# Patient Record
Sex: MALE | Race: WHITE | NOT HISPANIC OR LATINO
[De-identification: names, ages, dates, MRNs, and addresses within clinical notes are randomized per-mention and may not be internally consistent; named-entity substitution may affect disease eponyms.]

---

## 2019-08-12 ENCOUNTER — FORM ENCOUNTER (OUTPATIENT)
Age: 79
End: 2019-08-12

## 2019-08-26 ENCOUNTER — FORM ENCOUNTER (OUTPATIENT)
Age: 79
End: 2019-08-26

## 2019-09-04 ENCOUNTER — FORM ENCOUNTER (OUTPATIENT)
Age: 79
End: 2019-09-04

## 2019-09-15 ENCOUNTER — FORM ENCOUNTER (OUTPATIENT)
Age: 79
End: 2019-09-15

## 2019-09-17 ENCOUNTER — FORM ENCOUNTER (OUTPATIENT)
Age: 79
End: 2019-09-17

## 2019-10-21 ENCOUNTER — FORM ENCOUNTER (OUTPATIENT)
Age: 79
End: 2019-10-21

## 2020-01-06 ENCOUNTER — FORM ENCOUNTER (OUTPATIENT)
Age: 80
End: 2020-01-06

## 2020-01-07 ENCOUNTER — EMERGENCY (EMERGENCY)
Facility: HOSPITAL | Age: 80
LOS: 0 days | Discharge: HOME | End: 2020-01-08
Attending: EMERGENCY MEDICINE | Admitting: EMERGENCY MEDICINE
Payer: MEDICARE

## 2020-01-07 VITALS
DIASTOLIC BLOOD PRESSURE: 63 MMHG | OXYGEN SATURATION: 95 % | HEART RATE: 81 BPM | TEMPERATURE: 98 F | SYSTOLIC BLOOD PRESSURE: 118 MMHG | RESPIRATION RATE: 18 BRPM

## 2020-01-07 DIAGNOSIS — J12.9 VIRAL PNEUMONIA, UNSPECIFIED: ICD-10-CM

## 2020-01-07 DIAGNOSIS — M79.10 MYALGIA, UNSPECIFIED SITE: ICD-10-CM

## 2020-01-07 DIAGNOSIS — Z87.891 PERSONAL HISTORY OF NICOTINE DEPENDENCE: ICD-10-CM

## 2020-01-07 DIAGNOSIS — R05 COUGH: ICD-10-CM

## 2020-01-07 DIAGNOSIS — R42 DIZZINESS AND GIDDINESS: ICD-10-CM

## 2020-01-07 LAB
ALBUMIN SERPL ELPH-MCNC: 4.5 G/DL — SIGNIFICANT CHANGE UP (ref 3.5–5.2)
ALP SERPL-CCNC: 141 U/L — HIGH (ref 30–115)
ALT FLD-CCNC: 11 U/L — SIGNIFICANT CHANGE UP (ref 0–41)
ANION GAP SERPL CALC-SCNC: 18 MMOL/L — HIGH (ref 7–14)
APPEARANCE UR: ABNORMAL
AST SERPL-CCNC: 29 U/L — SIGNIFICANT CHANGE UP (ref 0–41)
BASOPHILS # BLD AUTO: 0.03 K/UL — SIGNIFICANT CHANGE UP (ref 0–0.2)
BASOPHILS NFR BLD AUTO: 0.3 % — SIGNIFICANT CHANGE UP (ref 0–1)
BILIRUB SERPL-MCNC: 1.2 MG/DL — SIGNIFICANT CHANGE UP (ref 0.2–1.2)
BILIRUB UR-MCNC: ABNORMAL
BUN SERPL-MCNC: 25 MG/DL — HIGH (ref 10–20)
CALCIUM SERPL-MCNC: 8.7 MG/DL — SIGNIFICANT CHANGE UP (ref 8.5–10.1)
CHLORIDE SERPL-SCNC: 97 MMOL/L — LOW (ref 98–110)
CO2 SERPL-SCNC: 19 MMOL/L — SIGNIFICANT CHANGE UP (ref 17–32)
COLOR SPEC: YELLOW — SIGNIFICANT CHANGE UP
CREAT SERPL-MCNC: 1.7 MG/DL — HIGH (ref 0.7–1.5)
DIFF PNL FLD: NEGATIVE — SIGNIFICANT CHANGE UP
EOSINOPHIL # BLD AUTO: 0.04 K/UL — SIGNIFICANT CHANGE UP (ref 0–0.7)
EOSINOPHIL NFR BLD AUTO: 0.4 % — SIGNIFICANT CHANGE UP (ref 0–8)
GLUCOSE SERPL-MCNC: 95 MG/DL — SIGNIFICANT CHANGE UP (ref 70–99)
GLUCOSE UR QL: NEGATIVE — SIGNIFICANT CHANGE UP
HCT VFR BLD CALC: 37.4 % — LOW (ref 42–52)
HGB BLD-MCNC: 12.6 G/DL — LOW (ref 14–18)
IMM GRANULOCYTES NFR BLD AUTO: 0.4 % — HIGH (ref 0.1–0.3)
KETONES UR-MCNC: NEGATIVE — SIGNIFICANT CHANGE UP
LACTATE SERPL-SCNC: 1.3 MMOL/L — SIGNIFICANT CHANGE UP (ref 0.7–2)
LEUKOCYTE ESTERASE UR-ACNC: NEGATIVE — SIGNIFICANT CHANGE UP
LYMPHOCYTES # BLD AUTO: 2.16 K/UL — SIGNIFICANT CHANGE UP (ref 1.2–3.4)
LYMPHOCYTES # BLD AUTO: 20 % — LOW (ref 20.5–51.1)
MCHC RBC-ENTMCNC: 30.6 PG — SIGNIFICANT CHANGE UP (ref 27–31)
MCHC RBC-ENTMCNC: 33.7 G/DL — SIGNIFICANT CHANGE UP (ref 32–37)
MCV RBC AUTO: 90.8 FL — SIGNIFICANT CHANGE UP (ref 80–94)
MONOCYTES # BLD AUTO: 0.78 K/UL — HIGH (ref 0.1–0.6)
MONOCYTES NFR BLD AUTO: 7.2 % — SIGNIFICANT CHANGE UP (ref 1.7–9.3)
NEUTROPHILS # BLD AUTO: 7.74 K/UL — HIGH (ref 1.4–6.5)
NEUTROPHILS NFR BLD AUTO: 71.7 % — SIGNIFICANT CHANGE UP (ref 42.2–75.2)
NITRITE UR-MCNC: NEGATIVE — SIGNIFICANT CHANGE UP
NRBC # BLD: 0 /100 WBCS — SIGNIFICANT CHANGE UP (ref 0–0)
NT-PROBNP SERPL-SCNC: 496 PG/ML — HIGH (ref 0–300)
PH UR: 5.5 — SIGNIFICANT CHANGE UP (ref 5–8)
PLATELET # BLD AUTO: 169 K/UL — SIGNIFICANT CHANGE UP (ref 130–400)
POTASSIUM SERPL-MCNC: 4.8 MMOL/L — SIGNIFICANT CHANGE UP (ref 3.5–5)
POTASSIUM SERPL-SCNC: 4.8 MMOL/L — SIGNIFICANT CHANGE UP (ref 3.5–5)
PROT SERPL-MCNC: 7.5 G/DL — SIGNIFICANT CHANGE UP (ref 6–8)
PROT UR-MCNC: ABNORMAL
RBC # BLD: 4.12 M/UL — LOW (ref 4.7–6.1)
RBC # FLD: 12.9 % — SIGNIFICANT CHANGE UP (ref 11.5–14.5)
SODIUM SERPL-SCNC: 134 MMOL/L — LOW (ref 135–146)
SP GR SPEC: 1.02 — SIGNIFICANT CHANGE UP (ref 1.01–1.02)
TROPONIN T SERPL-MCNC: <0.01 NG/ML — SIGNIFICANT CHANGE UP
UROBILINOGEN FLD QL: ABNORMAL
WBC # BLD: 10.79 K/UL — SIGNIFICANT CHANGE UP (ref 4.8–10.8)
WBC # FLD AUTO: 10.79 K/UL — SIGNIFICANT CHANGE UP (ref 4.8–10.8)

## 2020-01-07 PROCEDURE — 99285 EMERGENCY DEPT VISIT HI MDM: CPT

## 2020-01-07 PROCEDURE — 93010 ELECTROCARDIOGRAM REPORT: CPT

## 2020-01-07 PROCEDURE — 71046 X-RAY EXAM CHEST 2 VIEWS: CPT | Mod: 26

## 2020-01-07 RX ORDER — SODIUM CHLORIDE 9 MG/ML
1000 INJECTION INTRAMUSCULAR; INTRAVENOUS; SUBCUTANEOUS ONCE
Refills: 0 | Status: COMPLETED | OUTPATIENT
Start: 2020-01-07 | End: 2020-01-07

## 2020-01-07 RX ORDER — SODIUM CHLORIDE 9 MG/ML
3 INJECTION INTRAMUSCULAR; INTRAVENOUS; SUBCUTANEOUS EVERY 8 HOURS
Refills: 0 | Status: DISCONTINUED | OUTPATIENT
Start: 2020-01-07 | End: 2020-01-08

## 2020-01-07 RX ADMIN — SODIUM CHLORIDE 3 MILLILITER(S): 9 INJECTION INTRAMUSCULAR; INTRAVENOUS; SUBCUTANEOUS at 22:00

## 2020-01-07 RX ADMIN — SODIUM CHLORIDE 1000 MILLILITER(S): 9 INJECTION INTRAMUSCULAR; INTRAVENOUS; SUBCUTANEOUS at 21:27

## 2020-01-07 NOTE — ED ADULT TRIAGE NOTE - CHIEF COMPLAINT QUOTE
BIBS from [facility] with low bp and dizziness/ pt. states has been having flu -like symptoms for 3 days, flu swab in office was negative

## 2020-01-07 NOTE — ED PROVIDER NOTE - PHYSICAL EXAMINATION
VITAL SIGNS: I have reviewed nursing notes and confirm.  CONSTITUTIONAL: Well-developed; well-nourished; in no acute distress.  SKIN: Skin exam is warm and dry, no acute rash. Mild venous stasis dermatitis  HEAD: Normocephalic; atraumatic.  EYES: PERRL, EOM intact; conjunctiva and sclera clear.  ENT: No nasal discharge; airway clear.  NECK: Supple; non tender.  CARD: S1, S2 normal; no murmurs, gallops, or rubs. Regular rate and rhythm.  RESP: No wheezes, rales or rhonchi.  ABD: Normal bowel sounds; soft; non-distended; non-tender; no hepatosplenomegaly.  BACK: mild discomfort to lower back, paraspinal area, over muscles, no flank tenderness  EXT: Normal ROM. No clubbing, cyanosis or edema.  LYMPH: No acute cervical adenopathy.  NEURO: Alert, oriented. Grossly unremarkable. No focal deficits.  PSYCH: Cooperative, appropriate.

## 2020-01-07 NOTE — ED PROVIDER NOTE - OBJECTIVE STATEMENT
80 yo male with pmh of cad with 2 stents, HTN, ptsd, bph presents to the ER for dizziness/flu like symptoms for 3 days. Pt states he has runny nose, sneezing, shivering, cold sweats, body aches. Pt drinking and eating very little for past 2 days. Went to see his cardio MD today (Dr Hernandez) who noticed low bp, gave him water and pretzels and told him to go to [facility] for flu test. Pt also had some diarrhea over past couple of days which is now improving. Pt denies N/V/dysuria. Is making urine but it is dark. States he went to [facility] and had SBP of 62. Denies HA/neck pain/rash/fever/belly pain/cp/leg pain or swelling.     PMH as above  Meds: isosorbide, metoprolol, plavix, lexapro, asa 81, flomax, proscar,   ALL: nkda  SH: +vapes, former smoker  PMD denies, Cardio Dr Hernandez

## 2020-01-07 NOTE — ED PROVIDER NOTE - CLINICAL SUMMARY MEDICAL DECISION MAKING FREE TEXT BOX
78 yo male with pmh of cad, htn, ptsd, bph, presents to ER for 3 days of flu-like sx. [place] [facility] given IVFs which helped him feel better, and vitals stable in our ER. Offered him admission which he refused, wants to go home. Given IVFs, checked labs, ekg, xray, given tamiflu and doxy and recommend follow up with PMD. Return to ER for any worsening of symptoms.

## 2020-01-07 NOTE — ED PROVIDER NOTE - PATIENT PORTAL LINK FT
You can access the FollowMyHealth Patient Portal offered by [facility] by registering at the following website: [email or web address]. By joining [employer]’s FollowMyHealth portal, you will also be able to view your health information using other applications (apps) compatible with our system.

## 2020-01-08 VITALS
RESPIRATION RATE: 18 BRPM | OXYGEN SATURATION: 99 % | DIASTOLIC BLOOD PRESSURE: 67 MMHG | HEART RATE: 66 BPM | TEMPERATURE: 97 F | SYSTOLIC BLOOD PRESSURE: 133 MMHG

## 2020-01-08 LAB
BACTERIA # UR AUTO: NEGATIVE — SIGNIFICANT CHANGE UP
EPI CELLS # UR: 4 /HPF — SIGNIFICANT CHANGE UP (ref 0–5)
HYALINE CASTS # UR AUTO: 48 /LPF — HIGH (ref 0–7)
RBC CASTS # UR COMP ASSIST: 13 /HPF — HIGH (ref 0–4)
WBC UR QL: 6 /HPF — HIGH (ref 0–5)

## 2020-01-08 NOTE — ED ADULT NURSE NOTE - NSIMPLEMENTINTERV_GEN_ALL_ED
Implemented All Universal Safety Interventions:  [place] to call system. Call bell, personal items and telephone within reach. Instruct patient to call for assistance. Room bathroom lighting operational. Non-slip footwear when patient is off stretcher. Physically safe environment: no spills, clutter or unnecessary equipment. Stretcher in lowest position, wheels locked, appropriate side rails in place.

## 2020-01-08 NOTE — ED ADULT NURSE NOTE - CHIEF COMPLAINT QUOTE
BIBS from [facility] with low bp and dizziness/ pt. states has been having flu -like symptoms for 3 days, flu swab in office was negative

## 2020-01-08 NOTE — ED ADULT NURSE NOTE - OBJECTIVE STATEMENT
pt with dizziness  went to [facility] for URI s/s, sent to ED from [facility] for low BP

## 2020-01-20 ENCOUNTER — FORM ENCOUNTER (OUTPATIENT)
Age: 80
End: 2020-01-20

## 2020-03-15 ENCOUNTER — FORM ENCOUNTER (OUTPATIENT)
Age: 80
End: 2020-03-15

## 2020-03-28 ENCOUNTER — INPATIENT (INPATIENT)
Facility: HOSPITAL | Age: 80
LOS: 3 days | Discharge: HOME | End: 2020-04-01
Attending: INTERNAL MEDICINE | Admitting: INTERNAL MEDICINE
Payer: MEDICARE

## 2020-03-28 VITALS
OXYGEN SATURATION: 97 % | RESPIRATION RATE: 18 BRPM | HEART RATE: 84 BPM | SYSTOLIC BLOOD PRESSURE: 98 MMHG | TEMPERATURE: 92 F | DIASTOLIC BLOOD PRESSURE: 64 MMHG

## 2020-03-28 DIAGNOSIS — U07.1 COVID-19: ICD-10-CM

## 2020-03-28 LAB
ALBUMIN SERPL ELPH-MCNC: 3.4 G/DL — LOW (ref 3.5–5.2)
ALP SERPL-CCNC: 119 U/L — HIGH (ref 30–115)
ALT FLD-CCNC: 13 U/L — SIGNIFICANT CHANGE UP (ref 0–41)
ANION GAP SERPL CALC-SCNC: 17 MMOL/L — HIGH (ref 7–14)
APPEARANCE UR: ABNORMAL
APTT BLD: 37.5 SEC — SIGNIFICANT CHANGE UP (ref 27–39.2)
AST SERPL-CCNC: 19 U/L — SIGNIFICANT CHANGE UP (ref 0–41)
BACTERIA # UR AUTO: NEGATIVE — SIGNIFICANT CHANGE UP
BASE EXCESS BLDV CALC-SCNC: -8.8 MMOL/L — LOW (ref -2–2)
BASOPHILS # BLD AUTO: 0.01 K/UL — SIGNIFICANT CHANGE UP (ref 0–0.2)
BASOPHILS NFR BLD AUTO: 0.1 % — SIGNIFICANT CHANGE UP (ref 0–1)
BILIRUB SERPL-MCNC: 0.5 MG/DL — SIGNIFICANT CHANGE UP (ref 0.2–1.2)
BILIRUB UR-MCNC: ABNORMAL
BLD GP AB SCN SERPL QL: SIGNIFICANT CHANGE UP
BUN SERPL-MCNC: 18 MG/DL — SIGNIFICANT CHANGE UP (ref 10–20)
CA-I SERPL-SCNC: 1.08 MMOL/L — LOW (ref 1.12–1.3)
CALCIUM SERPL-MCNC: 7.8 MG/DL — LOW (ref 8.5–10.1)
CHLORIDE SERPL-SCNC: 109 MMOL/L — SIGNIFICANT CHANGE UP (ref 98–110)
CO2 SERPL-SCNC: 17 MMOL/L — SIGNIFICANT CHANGE UP (ref 17–32)
COLOR SPEC: YELLOW — SIGNIFICANT CHANGE UP
CREAT SERPL-MCNC: 1.3 MG/DL — SIGNIFICANT CHANGE UP (ref 0.7–1.5)
DIFF PNL FLD: NEGATIVE — SIGNIFICANT CHANGE UP
EOSINOPHIL # BLD AUTO: 0.02 K/UL — SIGNIFICANT CHANGE UP (ref 0–0.7)
EOSINOPHIL NFR BLD AUTO: 0.3 % — SIGNIFICANT CHANGE UP (ref 0–8)
EPI CELLS # UR: 6 /HPF — HIGH (ref 0–5)
FLU A RESULT: NEGATIVE — SIGNIFICANT CHANGE UP
FLU A RESULT: NEGATIVE — SIGNIFICANT CHANGE UP
FLUAV AG NPH QL: NEGATIVE — SIGNIFICANT CHANGE UP
FLUBV AG NPH QL: NEGATIVE — SIGNIFICANT CHANGE UP
GAS PNL BLDV: 143 MMOL/L — SIGNIFICANT CHANGE UP (ref 136–145)
GAS PNL BLDV: SIGNIFICANT CHANGE UP
GLUCOSE SERPL-MCNC: 106 MG/DL — HIGH (ref 70–99)
GLUCOSE UR QL: NEGATIVE — SIGNIFICANT CHANGE UP
HCO3 BLDV-SCNC: 17 MMOL/L — LOW (ref 22–29)
HCT VFR BLD CALC: 35.6 % — LOW (ref 42–52)
HCT VFR BLDA CALC: 35.6 % — SIGNIFICANT CHANGE UP (ref 34–44)
HGB BLD CALC-MCNC: 11.6 G/DL — LOW (ref 14–18)
HGB BLD-MCNC: 12.2 G/DL — LOW (ref 14–18)
HYALINE CASTS # UR AUTO: 40 /LPF — HIGH (ref 0–7)
IMM GRANULOCYTES NFR BLD AUTO: 1.7 % — HIGH (ref 0.1–0.3)
INR BLD: 1.17 RATIO — SIGNIFICANT CHANGE UP (ref 0.65–1.3)
KETONES UR-MCNC: ABNORMAL
LACTATE BLDV-MCNC: 2 MMOL/L — HIGH (ref 0.5–1.6)
LEUKOCYTE ESTERASE UR-ACNC: NEGATIVE — SIGNIFICANT CHANGE UP
LYMPHOCYTES # BLD AUTO: 1.69 K/UL — SIGNIFICANT CHANGE UP (ref 1.2–3.4)
LYMPHOCYTES # BLD AUTO: 24.4 % — SIGNIFICANT CHANGE UP (ref 20.5–51.1)
MCHC RBC-ENTMCNC: 30.8 PG — SIGNIFICANT CHANGE UP (ref 27–31)
MCHC RBC-ENTMCNC: 34.3 G/DL — SIGNIFICANT CHANGE UP (ref 32–37)
MCV RBC AUTO: 89.9 FL — SIGNIFICANT CHANGE UP (ref 80–94)
MONOCYTES # BLD AUTO: 0.75 K/UL — HIGH (ref 0.1–0.6)
MONOCYTES NFR BLD AUTO: 10.8 % — HIGH (ref 1.7–9.3)
NEUTROPHILS # BLD AUTO: 4.34 K/UL — SIGNIFICANT CHANGE UP (ref 1.4–6.5)
NEUTROPHILS NFR BLD AUTO: 62.7 % — SIGNIFICANT CHANGE UP (ref 42.2–75.2)
NITRITE UR-MCNC: NEGATIVE — SIGNIFICANT CHANGE UP
NRBC # BLD: 0 /100 WBCS — SIGNIFICANT CHANGE UP (ref 0–0)
PCO2 BLDV: 36 MMHG — LOW (ref 41–51)
PH BLDV: 7.28 — SIGNIFICANT CHANGE UP (ref 7.26–7.43)
PH UR: 6.5 — SIGNIFICANT CHANGE UP (ref 5–8)
PLATELET # BLD AUTO: 432 K/UL — HIGH (ref 130–400)
PO2 BLDV: 26 MMHG — SIGNIFICANT CHANGE UP (ref 20–40)
POTASSIUM BLDV-SCNC: 3.5 MMOL/L — SIGNIFICANT CHANGE UP (ref 3.3–5.6)
POTASSIUM SERPL-MCNC: 3.9 MMOL/L — SIGNIFICANT CHANGE UP (ref 3.5–5)
POTASSIUM SERPL-SCNC: 3.9 MMOL/L — SIGNIFICANT CHANGE UP (ref 3.5–5)
PROT SERPL-MCNC: 6.5 G/DL — SIGNIFICANT CHANGE UP (ref 6–8)
PROT UR-MCNC: ABNORMAL
PROTHROM AB SERPL-ACNC: 13.5 SEC — HIGH (ref 9.95–12.87)
RBC # BLD: 3.96 M/UL — LOW (ref 4.7–6.1)
RBC # FLD: 12.3 % — SIGNIFICANT CHANGE UP (ref 11.5–14.5)
RBC CASTS # UR COMP ASSIST: 5 /HPF — HIGH (ref 0–4)
RSV RESULT: NEGATIVE — SIGNIFICANT CHANGE UP
RSV RNA RESP QL NAA+PROBE: NEGATIVE — SIGNIFICANT CHANGE UP
SAO2 % BLDV: 41 % — SIGNIFICANT CHANGE UP
SODIUM SERPL-SCNC: 143 MMOL/L — SIGNIFICANT CHANGE UP (ref 135–146)
SP GR SPEC: >1.05 (ref 1.01–1.02)
TROPONIN T SERPL-MCNC: <0.01 NG/ML — SIGNIFICANT CHANGE UP
UROBILINOGEN FLD QL: ABNORMAL
WBC # BLD: 6.93 K/UL — SIGNIFICANT CHANGE UP (ref 4.8–10.8)
WBC # FLD AUTO: 6.93 K/UL — SIGNIFICANT CHANGE UP (ref 4.8–10.8)
WBC UR QL: 7 /HPF — HIGH (ref 0–5)

## 2020-03-28 PROCEDURE — 71045 X-RAY EXAM CHEST 1 VIEW: CPT | Mod: 26

## 2020-03-28 PROCEDURE — 71260 CT THORAX DX C+: CPT | Mod: 26

## 2020-03-28 PROCEDURE — 72125 CT NECK SPINE W/O DYE: CPT | Mod: 26

## 2020-03-28 PROCEDURE — 99291 CRITICAL CARE FIRST HOUR: CPT | Mod: GC

## 2020-03-28 PROCEDURE — 74177 CT ABD & PELVIS W/CONTRAST: CPT | Mod: 26

## 2020-03-28 PROCEDURE — 70450 CT HEAD/BRAIN W/O DYE: CPT | Mod: 26

## 2020-03-28 PROCEDURE — 93010 ELECTROCARDIOGRAM REPORT: CPT

## 2020-03-28 RX ORDER — SODIUM CHLORIDE 9 MG/ML
1000 INJECTION, SOLUTION INTRAVENOUS ONCE
Refills: 0 | Status: COMPLETED | OUTPATIENT
Start: 2020-03-28 | End: 2020-03-28

## 2020-03-28 RX ADMIN — SODIUM CHLORIDE 1000 MILLILITER(S): 9 INJECTION, SOLUTION INTRAVENOUS at 21:57

## 2020-03-28 NOTE — ED PROVIDER NOTE - OBJECTIVE STATEMENT
80M h/o CAD s/p stenting, HTN, obesity p/w syncope, hypotension. Pt was feeling weak today, had 1 episode of nonbloody diarrhea. While walking to bathroom, he syncopised and hit left side, causing left sided pain. Pain is 6/10, constant, left sided, ache-like, started after falling today. Pt syncopised and fell again in his bedroom. Denies fever/chills, cough, SOB, abd pain, n/v/d, dysuria.

## 2020-03-28 NOTE — ED PROVIDER NOTE - CLINICAL SUMMARY MEDICAL DECISION MAKING FREE TEXT BOX
pt aware of all labs and imaging, subacute/chronic rib fractures, cxr concerning for COVID typical findings, COVID testing sent, pt on isolation, hypotension responsive to fluids, medical admitting team aware of pt and admission as pt also had syncope.

## 2020-03-28 NOTE — ED PROVIDER NOTE - INPATIENT RESIDENT/ACP NOTIFIED DATE
Cyclobenzaprine refill:  Last seen: 1/13/17  Follow up appointment: none  Last filled: 1/19/17 (#90 with 0 refills)    Okay to refill?   Please advise.     
Message from [employer]:  Original authorizing provider: MD DEVIN GrossDY SHAWN would like a refill of the following medications:  cyclobenzaprine (FLEXERIL) 10 MG tablet [Kaitlynn Velaqzuez MD]    Preferred pharmacy: [facility] [address] [place] [address] [id number] [facility]    Comment:    
refilled    
28-Mar-2020 23:53

## 2020-03-28 NOTE — ED PROVIDER NOTE - ATTENDING CONTRIBUTION TO CARE
81 y/o m w/ pmhx of cad s/p stent, htn, obesity, poor historian, BIBEMA for hypotension associated with multiple episodes of syncope. pt reports was weak today, had diarrhea, non-bloody, walked to bathroom, passed out, hit the L side of his head and body. found to be hypotensive by ems systolic in 80's, reporting pain to l side of body. denies fever, chills, n/v, cp, sob, pleuritic chest pain, palpitations, diaphoresis, cough, blurry vision/visual changes, neck pain/stiffness, back pain, abd pain,, hip pain,  numbness/tingling, HA/LH/dizziness, lacerations, abrasions, ecchymoses, or swelling. GCS15.  On Exam: Vital Signs: I have reviewed the initial vital signs.  Constitutional: Male pt sitting on stretcher in nad.  HEAD: No signs of basilar skull fracture.  Integumentary: No rash. No lacerations, abrasions, ecchymoses or swelling.  EYES: No periorbital swelling/ecchymoses. EOM intact. No nystagmus.  ENT: Dry MM. No rhinorrhea/otorrhea. No septal hematoma. No mastoid ecchymoses.  NECK: Supple, non-tender, no spinous tenderness to neck. No palpable shelves or step-offs.  BACK: No spinous tenderness. No palpable shelves or step-offs.  Cardiovascular: RRR, radial pulses 2/4 b/l. No pain to palpation to chest wall.  Respiratory: BS present b/l, poor air exchange, no accessory muscle use, no stridor. Pain to palpation to L sided lateral ribs. No crepitus.  Gastrointestinal: BS present throughout all 4 quadrants, soft, nd, nt no rebound tenderness or guarding, no cvat.  Musculoskeletal: FROM, no edema, no hip pain to palpation. No short leg. No internal or external rotation of LE.  Neurologic: GCS 15. Awake and alerting following commands, motor 5/5 and sensation intact throughout upper and lowe ext, CN II-XII intact, No facial droop or slurring of speech. No focal deficits.

## 2020-03-28 NOTE — ED PROVIDER NOTE - PHYSICAL EXAMINATION
Constitutional: Well developed, well nourished. NAD. Good general hygiene  Head: Atraumatic.  Eyes: PERRLA. EOMI without discomfort.   ENT: No nasal discharge. Mucous membranes moist.  Neck: Supple. Painless ROM.  Cardiovascular: Regular rhythm. Regular rate. Normal S1 and S2. No murmurs. 2+ pulses in all extremities.   Pulmonary: Normal respiratory rate and effort. Lungs clear to auscultation bilaterally. No wheezing, rales, or rhonchi. Bilateral, equal lung expansion.   Abdominal: Soft. Nondistended. Nontender. No rebound or guarding.   Extremities. Pelvis stable. No lower extremity edema. Symmetric calves.  Skin: No rashes. No ecchymosis.  Neuro: AAOx3. CN 2-12, 5/5 strength in all extremities, sensation equal and intact in all extremities, no dysmetria, no pronator drift. Ambulating in ED w/o difficulty.

## 2020-03-28 NOTE — ED PROVIDER NOTE - CARE PLAN
Assessment and plan of treatment:	Plan: Monitor, EKG, CXR, labs, urine, pan scan, reassess. Principal Discharge DX:	Syncope  Assessment and plan of treatment:	Plan: Monitor, EKG, CXR, labs, urine, pan scan, reassess.  Secondary Diagnosis:	Hypotension  Secondary Diagnosis:	Ground glass opacity present on imaging of lung

## 2020-03-28 NOTE — ED PROVIDER NOTE - PROGRESS NOTE DETAILS
AA: 80M h/o cardiac disease, HTN p/w syncope and fall. pt was hypotensive to 70s systolic en route, BP 90s systolic here. Otherwise not tachypneic, hypoxic. Primary and secondary survey negative. EFAST negative. Ground glass opacities noted on CT chest. BP improved to 100s systolic after 1L of LR. Will swab and admit. AA: Pending attending CT reads, urine. Will likely admit to floor. Normotensive currently

## 2020-03-28 NOTE — ED ADULT TRIAGE NOTE - CHIEF COMPLAINT QUOTE
pt had 2 syncopal episodes today. found to be 60/40 at home. c/o dizziness. had sycopal episode last week and metoprolol was d/c. denies any cough, fever. c/o dizziness

## 2020-03-28 NOTE — ED PROVIDER NOTE - NS ED ROS FT
General: Weakness. No fever/chills.  Eyes:  No visual changes, eye pain or discharge.  ENMT:  No hearing changes, pain, no sore throat or runny nose, no difficulty swallowing  Cardiac:  No chest pain, SOB or edema. No chest pain with exertion.  Respiratory:  No cough or respiratory distress. No hemoptysis. No history of asthma or RAD.  GI:  No nausea, vomiting, diarrhea or abdominal pain.  :  No dysuria, frequency or burning.  MS: Left sided pain.   Neuro: Syncope. No LOC. No unilateral numnbess/weakness.  Skin:  No skin rash.   Endocrine: No history of thyroid disease or diabetes.

## 2020-03-28 NOTE — ED ADULT NURSE NOTE - OBJECTIVE STATEMENT
pt aox4; comes from home. came with ems for falls; had 2 syncopal episodes today. states he fell on his left side, denies chest pain / sob / fevers / chills. pt hypotensive at home and on arrival to ed. iv in place, labs sent. ivf infusing. sent for ct scan with rn at bedside. pt remains on continuous monitoring. will continue to monitor / assess

## 2020-03-29 LAB
ALBUMIN SERPL ELPH-MCNC: 3.6 G/DL — SIGNIFICANT CHANGE UP (ref 3.5–5.2)
ALLERGY+IMMUNOLOGY DIAG STUDY NOTE: SIGNIFICANT CHANGE UP
ALP SERPL-CCNC: 122 U/L — HIGH (ref 30–115)
ALT FLD-CCNC: 13 U/L — SIGNIFICANT CHANGE UP (ref 0–41)
ANION GAP SERPL CALC-SCNC: 14 MMOL/L — SIGNIFICANT CHANGE UP (ref 7–14)
AST SERPL-CCNC: 18 U/L — SIGNIFICANT CHANGE UP (ref 0–41)
BASOPHILS # BLD AUTO: 0.01 K/UL — SIGNIFICANT CHANGE UP (ref 0–0.2)
BASOPHILS NFR BLD AUTO: 0.2 % — SIGNIFICANT CHANGE UP (ref 0–1)
BILIRUB SERPL-MCNC: 0.5 MG/DL — SIGNIFICANT CHANGE UP (ref 0.2–1.2)
BUN SERPL-MCNC: 21 MG/DL — HIGH (ref 10–20)
CALCIUM SERPL-MCNC: 8.7 MG/DL — SIGNIFICANT CHANGE UP (ref 8.5–10.1)
CHLORIDE SERPL-SCNC: 107 MMOL/L — SIGNIFICANT CHANGE UP (ref 98–110)
CO2 SERPL-SCNC: 21 MMOL/L — SIGNIFICANT CHANGE UP (ref 17–32)
CREAT SERPL-MCNC: 1.2 MG/DL — SIGNIFICANT CHANGE UP (ref 0.7–1.5)
DAT IGG-SP REAG RBC-IMP: SIGNIFICANT CHANGE UP
DIR ANTIGLOB POLYSPECIFIC INTERPRETATION: ABNORMAL
EOSINOPHIL # BLD AUTO: 0.01 K/UL — SIGNIFICANT CHANGE UP (ref 0–0.7)
EOSINOPHIL NFR BLD AUTO: 0.2 % — SIGNIFICANT CHANGE UP (ref 0–8)
GLUCOSE SERPL-MCNC: 114 MG/DL — HIGH (ref 70–99)
GRAM STN FLD: SIGNIFICANT CHANGE UP
HCT VFR BLD CALC: 32.4 % — LOW (ref 42–52)
HGB BLD-MCNC: 11.3 G/DL — LOW (ref 14–18)
IAT COMP-SP REAG SERPL QL: ABNORMAL
IMM GRANULOCYTES NFR BLD AUTO: 0.8 % — HIGH (ref 0.1–0.3)
LYMPHOCYTES # BLD AUTO: 1.55 K/UL — SIGNIFICANT CHANGE UP (ref 1.2–3.4)
LYMPHOCYTES # BLD AUTO: 31.6 % — SIGNIFICANT CHANGE UP (ref 20.5–51.1)
MCHC RBC-ENTMCNC: 31.5 PG — HIGH (ref 27–31)
MCHC RBC-ENTMCNC: 34.9 G/DL — SIGNIFICANT CHANGE UP (ref 32–37)
MCV RBC AUTO: 90.3 FL — SIGNIFICANT CHANGE UP (ref 80–94)
MONOCYTES # BLD AUTO: 0.58 K/UL — SIGNIFICANT CHANGE UP (ref 0.1–0.6)
MONOCYTES NFR BLD AUTO: 11.8 % — HIGH (ref 1.7–9.3)
NEUTROPHILS # BLD AUTO: 2.72 K/UL — SIGNIFICANT CHANGE UP (ref 1.4–6.5)
NEUTROPHILS NFR BLD AUTO: 55.4 % — SIGNIFICANT CHANGE UP (ref 42.2–75.2)
NRBC # BLD: 0 /100 WBCS — SIGNIFICANT CHANGE UP (ref 0–0)
PLATELET # BLD AUTO: 409 K/UL — HIGH (ref 130–400)
POTASSIUM SERPL-MCNC: 5.2 MMOL/L — HIGH (ref 3.5–5)
POTASSIUM SERPL-SCNC: 5.2 MMOL/L — HIGH (ref 3.5–5)
PROT SERPL-MCNC: 6.7 G/DL — SIGNIFICANT CHANGE UP (ref 6–8)
RBC # BLD: 3.59 M/UL — LOW (ref 4.7–6.1)
RBC # FLD: 12.5 % — SIGNIFICANT CHANGE UP (ref 11.5–14.5)
SODIUM SERPL-SCNC: 142 MMOL/L — SIGNIFICANT CHANGE UP (ref 135–146)
SPECIMEN SOURCE: SIGNIFICANT CHANGE UP
TROPONIN T SERPL-MCNC: <0.01 NG/ML — SIGNIFICANT CHANGE UP
WBC # BLD: 4.91 K/UL — SIGNIFICANT CHANGE UP (ref 4.8–10.8)
WBC # FLD AUTO: 4.91 K/UL — SIGNIFICANT CHANGE UP (ref 4.8–10.8)

## 2020-03-29 PROCEDURE — 72170 X-RAY EXAM OF PELVIS: CPT | Mod: 26

## 2020-03-29 PROCEDURE — 99223 1ST HOSP IP/OBS HIGH 75: CPT | Mod: AI

## 2020-03-29 RX ORDER — METOPROLOL TARTRATE 50 MG
100 TABLET ORAL DAILY
Refills: 0 | Status: DISCONTINUED | OUTPATIENT
Start: 2020-03-29 | End: 2020-03-31

## 2020-03-29 RX ORDER — SIMVASTATIN 20 MG/1
40 TABLET, FILM COATED ORAL AT BEDTIME
Refills: 0 | Status: DISCONTINUED | OUTPATIENT
Start: 2020-03-29 | End: 2020-04-01

## 2020-03-29 RX ORDER — SODIUM CHLORIDE 9 MG/ML
500 INJECTION INTRAMUSCULAR; INTRAVENOUS; SUBCUTANEOUS ONCE
Refills: 0 | Status: COMPLETED | OUTPATIENT
Start: 2020-03-29 | End: 2020-03-29

## 2020-03-29 RX ORDER — ACETAMINOPHEN 500 MG
650 TABLET ORAL EVERY 6 HOURS
Refills: 0 | Status: DISCONTINUED | OUTPATIENT
Start: 2020-03-29 | End: 2020-04-01

## 2020-03-29 RX ORDER — FINASTERIDE 5 MG/1
5 TABLET, FILM COATED ORAL DAILY
Refills: 0 | Status: DISCONTINUED | OUTPATIENT
Start: 2020-03-29 | End: 2020-04-01

## 2020-03-29 RX ORDER — CHLORHEXIDINE GLUCONATE 213 G/1000ML
1 SOLUTION TOPICAL
Refills: 0 | Status: DISCONTINUED | OUTPATIENT
Start: 2020-03-29 | End: 2020-04-01

## 2020-03-29 RX ORDER — OXYBUTYNIN CHLORIDE 5 MG
10 TABLET ORAL DAILY
Refills: 0 | Status: DISCONTINUED | OUTPATIENT
Start: 2020-03-29 | End: 2020-04-01

## 2020-03-29 RX ORDER — ISOSORBIDE MONONITRATE 60 MG/1
30 TABLET, EXTENDED RELEASE ORAL DAILY
Refills: 0 | Status: DISCONTINUED | OUTPATIENT
Start: 2020-03-29 | End: 2020-04-01

## 2020-03-29 RX ORDER — ESCITALOPRAM OXALATE 10 MG/1
20 TABLET, FILM COATED ORAL DAILY
Refills: 0 | Status: DISCONTINUED | OUTPATIENT
Start: 2020-03-29 | End: 2020-04-01

## 2020-03-29 RX ORDER — TAMSULOSIN HYDROCHLORIDE 0.4 MG/1
0.4 CAPSULE ORAL AT BEDTIME
Refills: 0 | Status: DISCONTINUED | OUTPATIENT
Start: 2020-03-29 | End: 2020-04-01

## 2020-03-29 RX ORDER — ENOXAPARIN SODIUM 100 MG/ML
40 INJECTION SUBCUTANEOUS
Refills: 0 | Status: DISCONTINUED | OUTPATIENT
Start: 2020-03-29 | End: 2020-04-01

## 2020-03-29 RX ORDER — CLOPIDOGREL BISULFATE 75 MG/1
75 TABLET, FILM COATED ORAL DAILY
Refills: 0 | Status: DISCONTINUED | OUTPATIENT
Start: 2020-03-29 | End: 2020-04-01

## 2020-03-29 RX ADMIN — TAMSULOSIN HYDROCHLORIDE 0.4 MILLIGRAM(S): 0.4 CAPSULE ORAL at 22:55

## 2020-03-29 RX ADMIN — SODIUM CHLORIDE 500 MILLILITER(S): 9 INJECTION INTRAMUSCULAR; INTRAVENOUS; SUBCUTANEOUS at 00:59

## 2020-03-29 RX ADMIN — ENOXAPARIN SODIUM 40 MILLIGRAM(S): 100 INJECTION SUBCUTANEOUS at 18:51

## 2020-03-29 RX ADMIN — SIMVASTATIN 40 MILLIGRAM(S): 20 TABLET, FILM COATED ORAL at 22:55

## 2020-03-29 NOTE — H&P ADULT - NSHPPHYSICALEXAM_GEN_ALL_CORE
T(C): 36.6 (03-28-20 @ 21:30), Max: 36.6 (03-28-20 @ 21:30)  HR: 80 (03-29-20 @ 03:00) (80 - 88)  BP: 121/66 (03-29-20 @ 03:00) (91/57 - 130/73)  RR: 18 (03-29-20 @ 03:00) (18 - 18)  SpO2: 100% (03-29-20 @ 03:00) (97% - 100%)    PHYSICAL EXAM:  GENERAL: NAD, well-developed  HEAD:  Atraumatic, Normocephalic  EYES: EOMI, PERRLA, conjunctiva and sclera clear  ENT:No nasal obstruction or discharge. No tonsillar exudate, swelling or erythema.  NECK: Supple, No JVD  CHEST/LUNG: Clear to auscultation bilaterally, decreased BS, no wheezing/rhonchi/rales  HEART: Regular rate and rhythm; No murmurs, rubs, or gallops  ABDOMEN: Soft, Nontender, Nondistended; Bowel sounds present  EXTREMITIES:  2+ Peripheral Pulses, No clubbing, cyanosis, or edema  PSYCH: AAOx3  NEUROLOGY: non-focal  SKIN: No rashes or lesions Vital Signs Last 24 Hrs  T(C): 36.6 (28 Mar 2020 21:30), Max: 36.6 (28 Mar 2020 21:30)  T(F): 97.8 (28 Mar 2020 21:30), Max: 97.8 (28 Mar 2020 21:30)  HR: 80 (29 Mar 2020 03:00) (80 - 88)  BP: 121/66 (29 Mar 2020 03:00) (91/57 - 130/73)  BP(mean): --  RR: 18 (29 Mar 2020 03:00) (18 - 18)  SpO2: 100% (29 Mar 2020 03:00) (97% - 100%)    PHYSICAL EXAM:  GENERAL: NAD, well-developed  HEAD:  Atraumatic, Normocephalic  EYES: EOMI, PERRLA, conjunctiva and sclera clear  ENT:No nasal obstruction or discharge. No tonsillar exudate, swelling or erythema.  NECK: Supple, No JVD  CHEST/LUNG: Clear to auscultation bilaterally, decreased BS, no wheezing/rhonchi/rales  HEART: Regular rate and rhythm; No murmurs, rubs, or gallops  ABDOMEN: Soft, Nontender, Nondistended; Bowel sounds present  EXTREMITIES:  2+ Peripheral Pulses, No clubbing, cyanosis, or edema  PSYCH: AAOx3  NEUROLOGY: non-focal  SKIN: No rashes or lesions

## 2020-03-29 NOTE — CHART NOTE - NSCHARTNOTEFT_GEN_A_CORE
80 year old M with PMH of CAD s/p stenting and HTN who was brought in by ambulance from home after two episodes of syncope earlier today.  no new events or complaints reported.     syncope unclear etiology/ rule out viral infection /covid vs cardiac etiology:   SPO2 stable on RA .   tele reviewed - no events noted.   fu ECHo  Orthostatic VS incomplete/ repeat orthostatic VS and if poor oral intake- c/w IVF hydration with recent diarrhea.  fu Covid-19 PCR.   restart home meds for CAD and HTN.   tylenol prn for fever.   fu CRP/ procalcitonin.   repeat CXR in am.     dvt ppx.

## 2020-03-29 NOTE — H&P ADULT - NSHPLABSRESULTS_GEN_ALL_CORE
12.2   6.93  )-----------( 432      ( 28 Mar 2020 21:50 )             35.6       03-28    143  |  109  |  18  ----------------------------<  106<H>  3.9   |  17  |  1.3    Ca    7.8<L>      28 Mar 2020 21:50    TPro  6.5  /  Alb  3.4<L>  /  TBili  0.5  /  DBili  x   /  AST  19  /  ALT  13  /  AlkPhos  119<H>  03-28              Urinalysis Basic - ( 28 Mar 2020 23:40 )    Color: Yellow / Appearance: Slightly Turbid / SG: >1.050 / pH: x  Gluc: x / Ketone: Small  / Bili: Small / Urobili: 3 mg/dL   Blood: x / Protein: 100 mg/dL / Nitrite: Negative   Leuk Esterase: Negative / RBC: 5 /HPF / WBC 7 /HPF   Sq Epi: x / Non Sq Epi: 6 /HPF / Bacteria: Negative        PT/INR - ( 28 Mar 2020 21:50 )   PT: 13.50 sec;   INR: 1.17 ratio         PTT - ( 28 Mar 2020 21:50 )  PTT:37.5 sec    Lactate Trend      CARDIAC MARKERS ( 28 Mar 2020 21:50 )  x     / <0.01 ng/mL / x     / x     / x            CAPILLARY BLOOD GLUCOSE      POCT Blood Glucose.: 88 mg/dL (28 Mar 2020 21:28)      < from: CT Chest w/ IV Cont (03.28.20 @ 22:18) >    IMPRESSION:    Numerous bilateral peripheral groundglass patchy opacities and nodules. This is suspicious for viral pneumonia    Spiculated solid nodule in the left lower lobe measuring up to 10 mm. Further evaluation with PET CT and soft tissue sampling may be of benefit when patient is clinically able.    No evidence of acute traumatic injury to the chest, abdomen or pelvis.    Subacute/chronic fractures of the left eighth and 12th ribs.    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      < end of copied text >

## 2020-03-29 NOTE — H&P ADULT - ASSESSMENT
80 year old M with PMH of CAD s/p stenting and HTN who was brought in by ambulance from home after two episodes of syncope earlier today. EMS found him to be hypotensive in the 80s.  In the ED, trauma workup negative.  BP 98/64 and vitals otherwise stable.  Patient resuscitated with 1.5 L fluid and responded well (BP improved).  B/l peripheral ground glass opacities noted on CT chest and COVID-19 was sent.    #Syncope, fall-- vasovagal vs orthostatic hypotension vs cardiac etiology  -trauma workup negative   -xray pelvis pending  -s/p 1.5 L fluids  -repeat EKG, follow up AM Trops  -echo ordered  -check orthostats  -tele monitoring  -pain control as needed    #Ground glass opacities on CT chest  -concerning for viral pneumonia/COVID  -COVID PCR pending  -labs WNL, afebrile, and currently asymptomatic (no cough, SOB, fevers)  -monitor AM labs, fever curve    #10 mm nodule in LLL seen on CT chest  -incidental finding, follow up with pulm outpatient    #HTN  -hold for now due to hypotension    #CAD s/p stenting  -restart home meds once verified with pharmacy (patient does not recall med list)  -follows with cardiologist, Dr. Pack    DASH Diet  DVT ppx  FULL CODE    ***Call pharmacy in the morning for medication list- patient does not recall medications and pharmacy was closed***

## 2020-03-29 NOTE — H&P ADULT - HISTORY OF PRESENT ILLNESS
80 year old M with PMH of CAD s/p stenting and HTN who was brought in by ambulance from home after two episodes of syncope earlier today.  Patient reports that he had been feeling weak earlier today and had one episode of nonbloody diarrhea.  Then, while walking to the bathroom, he became very dizzy and syncopized.  He came to, walked to his bedroom, and then passed out again.  He reports having fallen on his L side due to left sided pain when he came to.   He denies fever/chills, cough, SOB, abd pain, nausea/vomiting.  EMS found him to be hypotensive in the 80s.    In the ED, trauma workup negative.  BP 98/64 and vitals otherwise stable.  Patient resuscitated with 1.5 L fluid and responded well (BP improved).  B/l peripheral ground glass opacities noted on CT chest and COVID-19 was sent.

## 2020-03-29 NOTE — PATIENT PROFILE ADULT - NSPRESCRALCFREQ_GEN_A_NUR
[facility]  Section Operative Note    Patient Name: Courtney Alaniz  MRN:[id number]  : 1980  Date of Surgery: 10/04/19   Pre-operative diagnosis:  1. IUP at 40w0d  2. History of prior c/s, desiring repeat    Post-operative diagnosis:  Same   Procedure:  Repeat  section via pfannenstiel skin incision with double layer uterine closure, excision of right ovarian fibroid   Surgeon:  Dr. Tanika Cifuentes (OB/GYN Attending Staff)    Assistant(s):  Nilesh Maloney, PAC - His assistance was needed for retraction and fundal pressure to aide with delivery of the baby.    Anesthesia:  Spinal with duramorph and TAPs block    Quantitative blood loss:  521 mL    Total IV fluids:  900ml crystaloid    Blood transfusion:  No transfusion was given during surgery    Drains:  Machado catheter   Specimens:  Right ovarian fibroid    Findings:  No abdominal wall adhesions. No intraabdominal adhesions. Clear fluid with amniotomy. Liveborn, vigorous male infant born vertex. APGARS  7 and 9. Weight: pending due to fetal resusitation. Placenta appeared intact with a three vessel cord and no apparent gross pathology. Uterus appears normal and clear of any retained product. Normal bilateral fallopian tubes and ovaries, other than [id number] firm fibroid. Hemostatic surgical site at the end of the case.    Complications:  None apparent    Condition:  Stable, transferred to PACU    Comments:  See accompanying operative report for full details    Indication: Courtney Alaniz is a 39 year old, , who was admitted at 40w0d by LMP c/w 39w0d ultrasound for planned repeat  delivery. The risks, benefits, and alternatives of  delivery were explained and the patient agreed to proceed.    Procedure:  After obtaining informed consent  , the patient was taken to the operating room. She received 2 grams of ancef prior to the skin incision. She was placed in the dorsal supine position with a  leftward tilt and prepped and draped in the usual sterile fashion. Following test of adequate spinal anesthesia, the abdomen was entered through a transverse skin incision through her previous scar. The skin incision was made sharply and carried through the subcutaneous tissue to the fascia.  Fascia was incised in the midline and extended laterally with the Trinidad scissors. The superior margin of the fascial incision was grasped with Kocher clamps and dissected from the underlying muscle sharp and blunt dissecton, which was then repeated at the lower margin of the fascial incision.  The muscle was  in the midline. The peritoneum was entered bluntly and the opening extended by digital dissection with care to avoid the bladder. A bladder blade was placed. The lower segment of the uterus was opened sharply in a transverse fashion and extended with digital pressure. The infant's head was elevated to the level of the hysterotomy and was delivered atraumatically. The cord was doubly clamped and cut and the infant was handed off to the waiting SCN staff after 1 minute of delayed cord clamping. A segment of the cord was cut and set aside for cord gases if needed. The placenta was extracted via cord traction. The uterus was exteriorized from the abdomen and cleared of all clots and debris. Oxytocin was given through the running IV. With vigorous massage as well as administration of oxytocin, good uterine tone was achieved. The hysterotomy was repaired with 0-vicryl suture in a running locked fashion. A 2nd layer of 0-monocryl was  used to imbricate the incision and good hemostasis was achieved. The right ovarian fibroid was excised with cautery. The posterior cul-de-sac was suctioned and the uterus was returned to the abdomen. The bilateral pericolic gutters were irrigated. The hysterotomy was again inspected and found to have no active sites of bleeding. The abdominal wall was examined and found to have no active  sites of bleeding. The fascia was closed with a running suture of 0-vicryl. Subcutaneous tissue was irrigated. Areas that were oozing were controlled with cautery. The subcutaneous tissue was less than 3cm in depth and did not require reapproximation. The skin was closed with 4-0 monocryl and dermabond. The patient tolerated the procedure well and was taken to the recovery room in stable condition. All sponge, needle and instrument counts were correct x2.      Tanika Cifuentes MD   OB/GYN           Never

## 2020-03-29 NOTE — H&P ADULT - ATTENDING COMMENTS
81 YO M with a PMH of CAD s/p stenting and HTN who was BIBEMS after experiencing 2 syncopaql episodes earlier today in his home. + LOC, unclear if head trauma. Preceding symptoms of light-headedness. Associated symptoms of 1 episode of diarrhea (Non-black/bloody). In the ED, a trauma work-up was negative. CT-chest revealed B/L peripheral GGOs. Pt isolated and COVID19 swab sent.     Physical exam shows pt in NAD. VSS, afebrile, not hypoxic on 2L NC. A&Ox3. Non-focal neuro exam. Muscle strength/sensation intact. CTA B/L with no W/C/R. RRR, no M/G/R. ABD is soft and non-tender, normoactive BSs. LEs without swelling. No rashes. Labs and radiology as above. QTc 436    Syncopal episodes likely due to orthostatic hypotension/vasovagal from viral syndrome + acute hypoxic respiratory failure, needs COVID19 rule out. - Recent travel. - COVID19 contact. Admit to COVID19 isolation unit. COVID19 swab sent. Isolate pt. Send CRP and procal. Start on hydroxychloroquine (CXR changes + acute hypoxic respiratory failure requiring supplemental O2). IV ABxs (Azithro). Vitamin C/Thiamine/Zinc. IVFs (LR). APAP PRN. Anti-tussives PRN. Supplemental O2 PRN. C/w statins, if LFTs are not > 75. No NSAIDs. Serial EKGs. Echo. TSH. Check orthostatics. Serial cardiac enzymes. Telem admit. Fall precautions.     Hx of CAD s/p stenting and HTN. Restart home meds. GI and DVT PPX. Inform PCP of pt's admission to hospital. Fall precautions. Rest as per above note.

## 2020-03-30 LAB
-  COAGULASE NEGATIVE STAPHYLOCOCCUS: SIGNIFICANT CHANGE UP
ALBUMIN SERPL ELPH-MCNC: 3.6 G/DL — SIGNIFICANT CHANGE UP (ref 3.5–5.2)
ALP SERPL-CCNC: 123 U/L — HIGH (ref 30–115)
ALT FLD-CCNC: 15 U/L — SIGNIFICANT CHANGE UP (ref 0–41)
ANION GAP SERPL CALC-SCNC: 12 MMOL/L — SIGNIFICANT CHANGE UP (ref 7–14)
AST SERPL-CCNC: 24 U/L — SIGNIFICANT CHANGE UP (ref 0–41)
BASOPHILS # BLD AUTO: 0.02 K/UL — SIGNIFICANT CHANGE UP (ref 0–0.2)
BASOPHILS NFR BLD AUTO: 0.4 % — SIGNIFICANT CHANGE UP (ref 0–1)
BILIRUB SERPL-MCNC: 0.4 MG/DL — SIGNIFICANT CHANGE UP (ref 0.2–1.2)
BUN SERPL-MCNC: 17 MG/DL — SIGNIFICANT CHANGE UP (ref 10–20)
CALCIUM SERPL-MCNC: 8.8 MG/DL — SIGNIFICANT CHANGE UP (ref 8.5–10.1)
CHLORIDE SERPL-SCNC: 107 MMOL/L — SIGNIFICANT CHANGE UP (ref 98–110)
CO2 SERPL-SCNC: 22 MMOL/L — SIGNIFICANT CHANGE UP (ref 17–32)
CREAT SERPL-MCNC: 0.9 MG/DL — SIGNIFICANT CHANGE UP (ref 0.7–1.5)
CULTURE RESULTS: SIGNIFICANT CHANGE UP
CULTURE RESULTS: SIGNIFICANT CHANGE UP
EOSINOPHIL # BLD AUTO: 0.02 K/UL — SIGNIFICANT CHANGE UP (ref 0–0.7)
EOSINOPHIL NFR BLD AUTO: 0.4 % — SIGNIFICANT CHANGE UP (ref 0–8)
GLUCOSE SERPL-MCNC: 103 MG/DL — HIGH (ref 70–99)
HCT VFR BLD CALC: 32.8 % — LOW (ref 42–52)
HGB BLD-MCNC: 11 G/DL — LOW (ref 14–18)
IMM GRANULOCYTES NFR BLD AUTO: 0.4 % — HIGH (ref 0.1–0.3)
LYMPHOCYTES # BLD AUTO: 1.71 K/UL — SIGNIFICANT CHANGE UP (ref 1.2–3.4)
LYMPHOCYTES # BLD AUTO: 36.5 % — SIGNIFICANT CHANGE UP (ref 20.5–51.1)
MAGNESIUM SERPL-MCNC: 2 MG/DL — SIGNIFICANT CHANGE UP (ref 1.8–2.4)
MCHC RBC-ENTMCNC: 30.2 PG — SIGNIFICANT CHANGE UP (ref 27–31)
MCHC RBC-ENTMCNC: 33.5 G/DL — SIGNIFICANT CHANGE UP (ref 32–37)
MCV RBC AUTO: 90.1 FL — SIGNIFICANT CHANGE UP (ref 80–94)
METHOD TYPE: SIGNIFICANT CHANGE UP
MONOCYTES # BLD AUTO: 0.47 K/UL — SIGNIFICANT CHANGE UP (ref 0.1–0.6)
MONOCYTES NFR BLD AUTO: 10 % — HIGH (ref 1.7–9.3)
NEUTROPHILS # BLD AUTO: 2.44 K/UL — SIGNIFICANT CHANGE UP (ref 1.4–6.5)
NEUTROPHILS NFR BLD AUTO: 52.3 % — SIGNIFICANT CHANGE UP (ref 42.2–75.2)
NRBC # BLD: 0 /100 WBCS — SIGNIFICANT CHANGE UP (ref 0–0)
ORGANISM # SPEC MICROSCOPIC CNT: SIGNIFICANT CHANGE UP
ORGANISM # SPEC MICROSCOPIC CNT: SIGNIFICANT CHANGE UP
PLATELET # BLD AUTO: 410 K/UL — HIGH (ref 130–400)
POTASSIUM SERPL-MCNC: 5 MMOL/L — SIGNIFICANT CHANGE UP (ref 3.5–5)
POTASSIUM SERPL-SCNC: 5 MMOL/L — SIGNIFICANT CHANGE UP (ref 3.5–5)
PROT SERPL-MCNC: 6.8 G/DL — SIGNIFICANT CHANGE UP (ref 6–8)
RBC # BLD: 3.64 M/UL — LOW (ref 4.7–6.1)
RBC # FLD: 12.5 % — SIGNIFICANT CHANGE UP (ref 11.5–14.5)
SARS-COV-2 RNA SPEC QL NAA+PROBE: (no result)
SODIUM SERPL-SCNC: 141 MMOL/L — SIGNIFICANT CHANGE UP (ref 135–146)
SPECIMEN SOURCE: SIGNIFICANT CHANGE UP
SPECIMEN SOURCE: SIGNIFICANT CHANGE UP
WBC # BLD: 4.68 K/UL — LOW (ref 4.8–10.8)
WBC # FLD AUTO: 4.68 K/UL — LOW (ref 4.8–10.8)

## 2020-03-30 PROCEDURE — 86077 PHYS BLOOD BANK SERV XMATCH: CPT

## 2020-03-30 PROCEDURE — 99233 SBSQ HOSP IP/OBS HIGH 50: CPT

## 2020-03-30 PROCEDURE — 71045 X-RAY EXAM CHEST 1 VIEW: CPT | Mod: 26

## 2020-03-30 RX ADMIN — ISOSORBIDE MONONITRATE 30 MILLIGRAM(S): 60 TABLET, EXTENDED RELEASE ORAL at 11:29

## 2020-03-30 RX ADMIN — TAMSULOSIN HYDROCHLORIDE 0.4 MILLIGRAM(S): 0.4 CAPSULE ORAL at 22:06

## 2020-03-30 RX ADMIN — ENOXAPARIN SODIUM 40 MILLIGRAM(S): 100 INJECTION SUBCUTANEOUS at 06:13

## 2020-03-30 RX ADMIN — FINASTERIDE 5 MILLIGRAM(S): 5 TABLET, FILM COATED ORAL at 11:29

## 2020-03-30 RX ADMIN — Medication 10 MILLIGRAM(S): at 11:29

## 2020-03-30 RX ADMIN — SIMVASTATIN 40 MILLIGRAM(S): 20 TABLET, FILM COATED ORAL at 22:06

## 2020-03-30 RX ADMIN — Medication 100 MILLIGRAM(S): at 06:14

## 2020-03-30 RX ADMIN — ENOXAPARIN SODIUM 40 MILLIGRAM(S): 100 INJECTION SUBCUTANEOUS at 17:23

## 2020-03-30 RX ADMIN — ESCITALOPRAM OXALATE 20 MILLIGRAM(S): 10 TABLET, FILM COATED ORAL at 11:29

## 2020-03-30 RX ADMIN — CLOPIDOGREL BISULFATE 75 MILLIGRAM(S): 75 TABLET, FILM COATED ORAL at 11:28

## 2020-03-30 NOTE — PROGRESS NOTE ADULT - SUBJECTIVE AND OBJECTIVE BOX
patient feels well   no chest pain   no sob   no cough   diarrhea resolved     Vital Signs Last 24 Hrs  T(C): 35.8 (30 Mar 2020 06:00), Max: 36.3 (29 Mar 2020 12:59)  T(F): 96.4 (30 Mar 2020 06:00), Max: 97.3 (29 Mar 2020 12:59)  HR: 66 (30 Mar 2020 06:00) (62 - 68)  BP: 119/61 (30 Mar 2020 06:00) (117/71 - 148/70)  BP(mean): --  RR: 18 (30 Mar 2020 08:38) (18 - 18)  SpO2: 98% (30 Mar 2020 08:38) (96% - 99%)    PHYSICAL EXAM:  GENERAL: NAD,  Pulm: Clear to auscultation bilaterally; No wheeze  CV: Regular rate and rhythm; No murmurs, rubs, or gallops  GI: Soft, Nontender, Nondistended; Bowel sounds present  EXTREMITIES:  2+ Peripheral Pulses, No clubbing, cyanosis, or edema  PSYCH: AAOx3  NEUROLOGY: non-focal  SKIN: No rashes or lesions                          11.3   4.91  )-----------( 409      ( 29 Mar 2020 06:18 )             32.4     03-29    142  |  107  |  21<H>  ----------------------------<  114<H>  5.2<H>   |  21  |  1.2    Ca    8.7      29 Mar 2020 06:18    TPro  6.7  /  Alb  3.6  /  TBili  0.5  /  DBili  x   /  AST  18  /  ALT  13  /  AlkPhos  122<H>  03-29    LIVER FUNCTIONS - ( 29 Mar 2020 06:18 )  Alb: 3.6 g/dL / Pro: 6.7 g/dL / ALK PHOS: 122 U/L / ALT: 13 U/L / AST: 18 U/L / GGT: x           PT/INR - ( 28 Mar 2020 21:50 )   PT: 13.50 sec;   INR: 1.17 ratio         PTT - ( 28 Mar 2020 21:50 )  PTT:37.5 sec  CARDIAC MARKERS ( 29 Mar 2020 06:18 )  x     / <0.01 ng/mL / x     / x     / x      CARDIAC MARKERS ( 28 Mar 2020 21:50 )  x     / <0.01 ng/mL / x     / x     / x            Culture - Urine (collected 28 Mar 2020 23:40)  Source: .Urine Clean Catch (Midstream)  Final Report (30 Mar 2020 07:31):    <10,000 CFU/mL Normal Urogenital Sofia    Culture - Blood (collected 28 Mar 2020 21:55)  Source: .Blood Blood-Peripheral  Preliminary Report (30 Mar 2020 05:02):    No growth to date.    Culture - Blood (collected 28 Mar 2020 21:55)  Source: .Blood Blood-Peripheral  Gram Stain (29 Mar 2020 21:47):    Growth in aerobic bottle: Gram Positive Cocci in Clusters  Preliminary Report (29 Mar 2020 21:47):    Growth in aerobic bottle: Gram Positive Cocci in Clusters    ***Blood Panel PCR results on this specimen are available    approximately 3 hours after the Gram stain result.***    Gram stain, PCR, and/or culture results may not always    correspond due to difference in methodologies.    ************************************************************    This PCR assay was performed using [employer].    The following targets are tested for: Enterococcus,    vancomycin resistant enterococci, Listeria monocytogenes,    coagulase negative staphylococci, S. aureus,    methicillin resistant S. aureus, Streptococcus agalactiae    (Group B), S. pneumoniae, S. pyogenes (Group A),    Acinetobacter baumannii, Enterobacter cloacae, E. coli,    Klebsiella oxytoca, K. pneumoniae, Proteus sp.,    Serratia marcescens, Haemophilus influenzae,    Neisseria meningitidis, Pseudomonas aeruginosa, Candida    albicans, C. glabrata, C krusei, C parapsilosis,    C. tropicalis and the KPC resistance gene.  Organism: Blood Culture PCR (30 Mar 2020 00:42)  Organism: Blood Culture PCR (30 Mar 2020 00:42)        MEDICATIONS  (STANDING):  chlorhexidine 4% Liquid 1 Application(s) Topical <User Schedule>  clopidogrel Tablet 75 milliGRAM(s) Oral daily  enoxaparin Injectable 40 milliGRAM(s) SubCutaneous two times a day  escitalopram 20 milliGRAM(s) Oral daily  finasteride 5 milliGRAM(s) Oral daily  isosorbide   mononitrate ER Tablet (IMDUR) 30 milliGRAM(s) Oral daily  metoprolol succinate  milliGRAM(s) Oral daily  oxybutynin 10 milliGRAM(s) Oral daily  simvastatin 40 milliGRAM(s) Oral at bedtime  tamsulosin 0.4 milliGRAM(s) Oral at bedtime    MEDICATIONS  (PRN):  acetaminophen   Tablet .. 650 milliGRAM(s) Oral every 6 hours PRN Temp greater or equal to 38C (100.4F)

## 2020-03-30 NOTE — CONSULT NOTE ADULT - SUBJECTIVE AND OBJECTIVE BOX
T(C): 36.1 (03-30-20 @ 13:19), Max: 36.1 (03-30-20 @ 13:19)  HR: 67 (03-30-20 @ 13:19) (62 - 68)  BP: 101/59 (03-30-20 @ 13:19) (101/59 - 148/70)  RR: 18 (03-30-20 @ 13:19) (18 - 18)  SpO2: 98% (03-30-20 @ 08:38) (96% - 99%)    MOTOR EXAM      Physical Medicine And Rehabilitation Services are not indicated in this patient for the following Reason(s): Patient admitted 2 days ago with syncope and hypotension and found to be COVID pos. Medically stable and to be discharged tomorrow. Chart documents that he was indep with cane upon admission. PT to see to al for appropriate ambulatory device. D/C home in am.    [    ] Patient is medically unstable      [    ]  Patient does not have appropriate activity orders      [     ] Patient has no weight bearing status for:      [  x   ] Patient is independently ambulating      [     ]  Patient is from Skilled Nursing Facility and is appropriate to return      [     ] Patient was non-ambulatory prior to admission      [     ]  Other      W[facility]CEL PM&R / PT request

## 2020-03-30 NOTE — PROGRESS NOTE ADULT - SUBJECTIVE AND OBJECTIVE BOX
JUDI HANEY 80y Male  MRN#: [id number]   CODE STATUS: FULL      SUBJECTIVE  Patient is a 80y old Male who presents with a chief complaint of syncope (30 Mar 2020 13:57)    Currently admitted to medicine with the primary diagnosis of Syncope    Today is hospital day 2d, and this morning he is laying in bed comfortably and reports no overnight events.     OBJECTIVE  PAST MEDICAL & SURGICAL HISTORY    ALLERGIES:  No Known Allergies    MEDICATIONS:  STANDING MEDICATIONS  chlorhexidine 4% Liquid 1 Application(s) Topical <User Schedule>  clopidogrel Tablet 75 milliGRAM(s) Oral daily  enoxaparin Injectable 40 milliGRAM(s) SubCutaneous two times a day  escitalopram 20 milliGRAM(s) Oral daily  finasteride 5 milliGRAM(s) Oral daily  isosorbide   mononitrate ER Tablet (IMDUR) 30 milliGRAM(s) Oral daily  metoprolol succinate  milliGRAM(s) Oral daily  oxybutynin 10 milliGRAM(s) Oral daily  simvastatin 40 milliGRAM(s) Oral at bedtime  tamsulosin 0.4 milliGRAM(s) Oral at bedtime    PRN MEDICATIONS  acetaminophen   Tablet .. 650 milliGRAM(s) Oral every 6 hours PRN      VITAL SIGNS: Last 24 Hours  T(C): 36 (30 Mar 2020 21:26), Max: 36.1 (30 Mar 2020 13:19)  T(F): 96.8 (30 Mar 2020 21:26), Max: 96.9 (30 Mar 2020 13:19)  HR: 55 (30 Mar 2020 21:26) (55 - 68)  BP: 115/66 (30 Mar 2020 21:26) (101/59 - 119/61)  BP(mean): --  RR: 18 (30 Mar 2020 21:26) (18 - 18)  SpO2: 98% (30 Mar 2020 08:38) (98% - 98%)    LABS:                        11.0   4.68  )-----------( 410      ( 30 Mar 2020 12:23 )             32.8     03-30    141  |  107  |  17  ----------------------------<  103<H>  5.0   |  22  |  0.9    Ca    8.8      30 Mar 2020 12:23  Mg     2.0     03-30    TPro  6.8  /  Alb  3.6  /  TBili  0.4  /  DBili  x   /  AST  24  /  ALT  15  /  AlkPhos  123<H>  03-30    PT/INR - ( 28 Mar 2020 21:50 )   PT: 13.50 sec;   INR: 1.17 ratio         PTT - ( 28 Mar 2020 21:50 )  PTT:37.5 sec  Urinalysis Basic - ( 28 Mar 2020 23:40 )    Color: Yellow / Appearance: Slightly Turbid / SG: >1.050 / pH: x  Gluc: x / Ketone: Small  / Bili: Small / Urobili: 3 mg/dL   Blood: x / Protein: 100 mg/dL / Nitrite: Negative   Leuk Esterase: Negative / RBC: 5 /HPF / WBC 7 /HPF   Sq Epi: x / Non Sq Epi: 6 /HPF / Bacteria: Negative      Culture - Urine (collected 28 Mar 2020 23:40)  Source: .Urine Clean Catch (Midstream)  Final Report (30 Mar 2020 07:31):    <10,000 CFU/mL Normal Urogenital Sofia    Culture - Blood (collected 28 Mar 2020 21:55)  Source: .Blood Blood-Peripheral  Preliminary Report (30 Mar 2020 05:02):    No growth to date.    Culture - Blood (collected 28 Mar 2020 21:55)  Source: .Blood Blood-Peripheral  Gram Stain (29 Mar 2020 21:47):    Growth in aerobic bottle: Gram Positive Cocci in Clusters  Final Report (30 Mar 2020 17:57):    Growth in aerobic bottle: Staphylococcus hominis    Coag Negative Staphylococcus    Single set isolate, possible contaminant. Contact    Microbiology if susceptibility testing clinically    indicated.    ***Blood Panel PCR results on this specimen are available    approximately 3 hours after the Gram stain result.***    Gram stain, PCR, and/or culture results may not always    correspond due to difference in methodologies.    ************************************************************    This PCR assay was performed using [employer].    The following targets are tested for: Enterococcus,    vancomycin resistant enterococci, Listeria monocytogenes,    coagulase negative staphylococci, S. aureus,    methicillin resistant S. aureus, Streptococcus agalactiae    (Group B), S. pneumoniae, S. pyogenes (Group A),    Acinetobacter baumannii, Enterobacter cloacae, E. coli,    Klebsiella oxytoca, K. pneumoniae, Proteus sp.,    Serratia marcescens, Haemophilus influenzae,    Neisseria meningitidis, Pseudomonas aeruginosa, Candida    albicans, C. glabrata, C krusei, C parapsilosis,    C. tropicalis and the KPC resistance gene.  Organism: Blood Culture PCR (30 Mar 2020 17:57)  Organism: Blood Culture PCR (30 Mar 2020 17:57)      CARDIAC MARKERS ( 29 Mar 2020 06:18 )  x     / <0.01 ng/mL / x     / x     / x      CARDIAC MARKERS ( 28 Mar 2020 21:50 )  x     / <0.01 ng/mL / x     / x     / x          RADIOLOGY:    < from: Xray Chest 1 View- PORTABLE-Urgent (03.30.20 @ 11:04) >  Impression:      Peripheral lung opacities are stable.    < end of copied text >      < from: Xray Pelvis AP only (03.29.20 @ 09:02) >  Impression:    No evidence of acute fracture.    < end of copied text >      < from: Xray Chest 1 View-PORTABLE IMMEDIATE (03.28.20 @ 22:48) >  Impression:      New predominantly right-sided peripheral airspace opacities suspicious for a viral pneumonia in the appropriate clinical setting.    No evidence of acute traumatic pathology.    < end of copied text >      <(03.28.20 @ 22:18) >  EXAM:  CT ABDOMEN AND PELVIS IC        EXAM:  CT CHEST IC          IMPRESSION:    Numerous bilateral peripheral groundglass patchy opacities and nodules. This is suspicious for viral pneumonia    Spiculated solid nodule in the left lower lobe measuring up to 10 mm. Further evaluation with PET CT and soft tissue sampling may be of benefit when patient is clinically able.    No evidence of acute traumatic injury to the chest, abdomen or pelvis.    Subacute/chronic fractures of the left eighth and 12th ribs.    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    < end of copied text >      < from: CT Cervical Spine No Cont (03.28.20 @ 22:18) >  IMPRESSION:    No acute fracture or significant subluxation of the cervical spine.      < end of copied text >      < from: CT Head No Cont (03.28.20 @ 22:18) >  IMPRESSION:      No CT evidence for acute intracranial pathology.    < end of copied text >    PHYSICAL EXAM:    Completed by hospitalist.      ASSESSMENT & PLAN    80 year old M with PMH of CAD s/p stenting and HTN who was brought in by ambulance from home after two episodes of syncope earlier today. EMS found him to be hypotensive in the 80s.  In the ED, trauma workup negative.  BP 98/64 and vitals otherwise stable.  Patient resuscitated with 1.5 L fluid and responded well (BP improved).  B/l peripheral ground glass opacities noted on CT chest and COVID-19 was sent.    #Ground glass opacities on CT chest- COVID positive  - 3/30 CXR with stable bilateral peripheral opacities  -labs WNL and remains afebrile  -monitor AM labs, cxr  -blood cx with coag neg staph- likely contaminant; f/u repeat cx    #Syncope, fall- likely secondary to COVID infection  -trauma workup negative   -trops neg x2  -orthostatics neg    #10 mm nodule in LLL seen on CT chest  -incidental finding, follow up with pulm outpatient    #HTN  -continue home meds    #CAD s/p stenting  -continue home meds  -follows with cardiologist, Dr. Pack    DASH Diet  DVT ppx  FULL CODE

## 2020-03-30 NOTE — PROGRESS NOTE ADULT - ASSESSMENT
#diarrhea  secondary to COVID 19 causing hypovolemia causing syncope:   EKG unremarkable   no events on tele   diarrhea resolved   chest xray mild B/L opacities on room air sats 97%. not sob, no cough,   not on plaquinil given that patient is asymptomatic   check CRP and procalcitonin for baseline in case patient gets worse     Coag neg staph blood culture likely contaminant repeat blood culture     CAD: c/w home meds. stable from cardiac stand point     PT/Rehab eval and discharge planning in 24 hours     DVT PPX #diarrhea  secondary to COVID 19 causing hypovolemia causing syncope:   EKG unremarkable   no events on tele   diarrhea resolved   chest xray mild B/L opacities on room air sats 97%. not sob, no cough,   not on plaquinil given that patient is asymptomatic   check CRP and procalcitonin for baseline in case patient gets worse     Coag neg staph blood culture likely contaminant repeat blood culture     rib fractures : pain control     CAD: c/w home meds. stable from cardiac stand point     PT/Rehab eval and discharge planning in 24 hours     DVT PPX     discussed plan of care with wife Becky states thst she can not take care of patient   will need safe discharge plan   will have  call wife

## 2020-03-31 LAB
ALBUMIN SERPL ELPH-MCNC: 3.7 G/DL — SIGNIFICANT CHANGE UP (ref 3.5–5.2)
ALP SERPL-CCNC: 121 U/L — HIGH (ref 30–115)
ALT FLD-CCNC: 14 U/L — SIGNIFICANT CHANGE UP (ref 0–41)
ANION GAP SERPL CALC-SCNC: 12 MMOL/L — SIGNIFICANT CHANGE UP (ref 7–14)
AST SERPL-CCNC: 21 U/L — SIGNIFICANT CHANGE UP (ref 0–41)
BASOPHILS # BLD AUTO: 0.01 K/UL — SIGNIFICANT CHANGE UP (ref 0–0.2)
BASOPHILS NFR BLD AUTO: 0.2 % — SIGNIFICANT CHANGE UP (ref 0–1)
BILIRUB SERPL-MCNC: 0.5 MG/DL — SIGNIFICANT CHANGE UP (ref 0.2–1.2)
BUN SERPL-MCNC: 15 MG/DL — SIGNIFICANT CHANGE UP (ref 10–20)
CALCIUM SERPL-MCNC: 8.7 MG/DL — SIGNIFICANT CHANGE UP (ref 8.5–10.1)
CHLORIDE SERPL-SCNC: 109 MMOL/L — SIGNIFICANT CHANGE UP (ref 98–110)
CO2 SERPL-SCNC: 23 MMOL/L — SIGNIFICANT CHANGE UP (ref 17–32)
CREAT SERPL-MCNC: 0.9 MG/DL — SIGNIFICANT CHANGE UP (ref 0.7–1.5)
CRP SERPL-MCNC: 1.11 MG/DL — HIGH (ref 0–0.4)
EOSINOPHIL # BLD AUTO: 0.02 K/UL — SIGNIFICANT CHANGE UP (ref 0–0.7)
EOSINOPHIL NFR BLD AUTO: 0.4 % — SIGNIFICANT CHANGE UP (ref 0–8)
GLUCOSE SERPL-MCNC: 95 MG/DL — SIGNIFICANT CHANGE UP (ref 70–99)
HCT VFR BLD CALC: 30.7 % — LOW (ref 42–52)
HGB BLD-MCNC: 10.6 G/DL — LOW (ref 14–18)
IMM GRANULOCYTES NFR BLD AUTO: 0.4 % — HIGH (ref 0.1–0.3)
LYMPHOCYTES # BLD AUTO: 1.9 K/UL — SIGNIFICANT CHANGE UP (ref 1.2–3.4)
LYMPHOCYTES # BLD AUTO: 41.8 % — SIGNIFICANT CHANGE UP (ref 20.5–51.1)
MAGNESIUM SERPL-MCNC: 2.1 MG/DL — SIGNIFICANT CHANGE UP (ref 1.8–2.4)
MCHC RBC-ENTMCNC: 31.5 PG — HIGH (ref 27–31)
MCHC RBC-ENTMCNC: 34.5 G/DL — SIGNIFICANT CHANGE UP (ref 32–37)
MCV RBC AUTO: 91.4 FL — SIGNIFICANT CHANGE UP (ref 80–94)
MONOCYTES # BLD AUTO: 0.54 K/UL — SIGNIFICANT CHANGE UP (ref 0.1–0.6)
MONOCYTES NFR BLD AUTO: 11.9 % — HIGH (ref 1.7–9.3)
NEUTROPHILS # BLD AUTO: 2.06 K/UL — SIGNIFICANT CHANGE UP (ref 1.4–6.5)
NEUTROPHILS NFR BLD AUTO: 45.3 % — SIGNIFICANT CHANGE UP (ref 42.2–75.2)
NRBC # BLD: 0 /100 WBCS — SIGNIFICANT CHANGE UP (ref 0–0)
PLATELET # BLD AUTO: 364 K/UL — SIGNIFICANT CHANGE UP (ref 130–400)
POTASSIUM SERPL-MCNC: 4.8 MMOL/L — SIGNIFICANT CHANGE UP (ref 3.5–5)
POTASSIUM SERPL-SCNC: 4.8 MMOL/L — SIGNIFICANT CHANGE UP (ref 3.5–5)
PROCALCITONIN SERPL-MCNC: 0.06 NG/ML — SIGNIFICANT CHANGE UP (ref 0.02–0.1)
PROT SERPL-MCNC: 6.6 G/DL — SIGNIFICANT CHANGE UP (ref 6–8)
RBC # BLD: 3.36 M/UL — LOW (ref 4.7–6.1)
RBC # FLD: 12.5 % — SIGNIFICANT CHANGE UP (ref 11.5–14.5)
SODIUM SERPL-SCNC: 144 MMOL/L — SIGNIFICANT CHANGE UP (ref 135–146)
WBC # BLD: 4.55 K/UL — LOW (ref 4.8–10.8)
WBC # FLD AUTO: 4.55 K/UL — LOW (ref 4.8–10.8)

## 2020-03-31 PROCEDURE — 71045 X-RAY EXAM CHEST 1 VIEW: CPT | Mod: 26

## 2020-03-31 PROCEDURE — 99232 SBSQ HOSP IP/OBS MODERATE 35: CPT

## 2020-03-31 RX ORDER — METOPROLOL TARTRATE 50 MG
50 TABLET ORAL DAILY
Refills: 0 | Status: DISCONTINUED | OUTPATIENT
Start: 2020-04-01 | End: 2020-04-01

## 2020-03-31 RX ADMIN — ESCITALOPRAM OXALATE 20 MILLIGRAM(S): 10 TABLET, FILM COATED ORAL at 12:21

## 2020-03-31 RX ADMIN — Medication 100 MILLIGRAM(S): at 05:39

## 2020-03-31 RX ADMIN — Medication 10 MILLIGRAM(S): at 12:21

## 2020-03-31 RX ADMIN — ENOXAPARIN SODIUM 40 MILLIGRAM(S): 100 INJECTION SUBCUTANEOUS at 17:13

## 2020-03-31 RX ADMIN — FINASTERIDE 5 MILLIGRAM(S): 5 TABLET, FILM COATED ORAL at 12:21

## 2020-03-31 RX ADMIN — CLOPIDOGREL BISULFATE 75 MILLIGRAM(S): 75 TABLET, FILM COATED ORAL at 12:21

## 2020-03-31 RX ADMIN — ENOXAPARIN SODIUM 40 MILLIGRAM(S): 100 INJECTION SUBCUTANEOUS at 05:39

## 2020-03-31 RX ADMIN — ISOSORBIDE MONONITRATE 30 MILLIGRAM(S): 60 TABLET, EXTENDED RELEASE ORAL at 12:21

## 2020-03-31 RX ADMIN — SIMVASTATIN 40 MILLIGRAM(S): 20 TABLET, FILM COATED ORAL at 21:29

## 2020-03-31 RX ADMIN — TAMSULOSIN HYDROCHLORIDE 0.4 MILLIGRAM(S): 0.4 CAPSULE ORAL at 21:29

## 2020-03-31 NOTE — PROGRESS NOTE ADULT - SUBJECTIVE AND OBJECTIVE BOX
For clarification, I did not cancel the PT consult and they are going to see the patient today to evaluate for ambulatory safety for discharge home. I apologize for any miscommunication.

## 2020-03-31 NOTE — PROGRESS NOTE ADULT - ASSESSMENT
#diarrhea  secondary to COVID 19 causing hypovolemia causing syncope:   EKG unremarkable   no events on tele   diarrhea resolved   chest xray mild B/L opacities on room air sats 98%. not sob, no cough,   not on plaquinil given that patient is not hypoxic   d/c tele       Coag neg staph blood culture likely contaminant repeat blood culture pending     rib fractures : pain control     CAD: c/w home meds. stable from cardiac stand point     needs PT eval patient states that he feels weak and unsteady on his feet will reorder PT eval     DVT PPX     discussed plan of care with wife Bceky and patient they are both interested in SNF if needed   pending PT/Rehab and repeat Blood culture

## 2020-03-31 NOTE — PROGRESS NOTE ADULT - SUBJECTIVE AND OBJECTIVE BOX
no chest pain   no sob   no cough   on room air   feels weak and tired     Vital Signs Last 24 Hrs  T(C): 35.8 (31 Mar 2020 06:30), Max: 36.1 (30 Mar 2020 13:19)  T(F): 96.5 (31 Mar 2020 06:30), Max: 96.9 (30 Mar 2020 13:19)  HR: 58 (31 Mar 2020 06:30) (55 - 67)  BP: 116/65 (31 Mar 2020 06:30) (101/59 - 116/65)  BP(mean): --  RR: 18 (31 Mar 2020 08:08) (18 - 18)  SpO2: 95% (31 Mar 2020 08:08) (95% - 97%)    PHYSICAL EXAM:  GENERAL: NAD,   Pulm: B/L air entry   HEART: Regular rate and rhythm;   GI: Soft, Nontender, Nondistended; Bowel sounds present  EXTREMITIES:  no  edema  PSYCH: AAOx3  NEUROLOGY: moving all ext   SKIN: No rashes or lesions                          10.6   4.55  )-----------( 364      ( 31 Mar 2020 06:14 )             30.7     03-31    144  |  109  |  15  ----------------------------<  95  4.8   |  23  |  0.9    Ca    8.7      31 Mar 2020 06:14  Mg     2.1     03-31    TPro  6.6  /  Alb  3.7  /  TBili  0.5  /  DBili  x   /  AST  21  /  ALT  14  /  AlkPhos  121<H>  03-31    LIVER FUNCTIONS - ( 31 Mar 2020 06:14 )  Alb: 3.7 g/dL / Pro: 6.6 g/dL / ALK PHOS: 121 U/L / ALT: 14 U/L / AST: 21 U/L / GGT: x                   Culture - Urine (collected 28 Mar 2020 23:40)  Source: .Urine Clean Catch (Midstream)  Final Report (30 Mar 2020 07:31):    <10,000 CFU/mL Normal Urogenital Sofia    Culture - Blood (collected 28 Mar 2020 21:55)  Source: .Blood Blood-Peripheral  Preliminary Report (30 Mar 2020 05:02):    No growth to date.    Culture - Blood (collected 28 Mar 2020 21:55)  Source: .Blood Blood-Peripheral  Gram Stain (29 Mar 2020 21:47):    Growth in aerobic bottle: Gram Positive Cocci in Clusters  Final Report (30 Mar 2020 17:57):    Growth in aerobic bottle: Staphylococcus hominis    Coag Negative Staphylococcus    Single set isolate, possible contaminant. Contact    Microbiology if susceptibility testing clinically    indicated.    ***Blood Panel PCR results on this specimen are available    approximately 3 hours after the Gram stain result.***    Gram stain, PCR, and/or culture results may not always    correspond due to difference in methodologies.    ************************************************************    This PCR assay was performed using [employer].    The following targets are tested for: Enterococcus,    vancomycin resistant enterococci, Listeria monocytogenes,    coagulase negative staphylococci, S. aureus,    methicillin resistant S. aureus, Streptococcus agalactiae    (Group B), S. pneumoniae, S. pyogenes (Group A),    Acinetobacter baumannii, Enterobacter cloacae, E. coli,    Klebsiella oxytoca, K. pneumoniae, Proteus sp.,    Serratia marcescens, Haemophilus influenzae,    Neisseria meningitidis, Pseudomonas aeruginosa, Candida    albicans, C. glabrata, C krusei, C parapsilosis,    C. tropicalis and the KPC resistance gene.  Organism: Blood Culture PCR (30 Mar 2020 17:57)  Organism: Blood Culture PCR (30 Mar 2020 17:57)

## 2020-03-31 NOTE — PROGRESS NOTE ADULT - SUBJECTIVE AND OBJECTIVE BOX
JUDI HANEY 80y Male  MRN#: [id number]   CODE STATUS: FULL      SUBJECTIVE  Patient is a 80y old Male who presents with a chief complaint of syncope (31 Mar 2020 11:59)    Currently admitted to medicine with the primary diagnosis of Syncope     Today is hospital day 3d, and this morning he is laying in bed comfortably and reports no overnight events.     OBJECTIVE  PAST MEDICAL & SURGICAL HISTORY    ALLERGIES:  No Known Allergies    MEDICATIONS:  STANDING MEDICATIONS  chlorhexidine 4% Liquid 1 Application(s) Topical <User Schedule>  clopidogrel Tablet 75 milliGRAM(s) Oral daily  enoxaparin Injectable 40 milliGRAM(s) SubCutaneous two times a day  escitalopram 20 milliGRAM(s) Oral daily  finasteride 5 milliGRAM(s) Oral daily  isosorbide   mononitrate ER Tablet (IMDUR) 30 milliGRAM(s) Oral daily  oxybutynin 10 milliGRAM(s) Oral daily  simvastatin 40 milliGRAM(s) Oral at bedtime  tamsulosin 0.4 milliGRAM(s) Oral at bedtime    PRN MEDICATIONS  acetaminophen   Tablet .. 650 milliGRAM(s) Oral every 6 hours PRN      VITAL SIGNS: Last 24 Hours  T(C): 35.8 (31 Mar 2020 06:30), Max: 36.1 (30 Mar 2020 13:19)  T(F): 96.5 (31 Mar 2020 06:30), Max: 96.9 (30 Mar 2020 13:19)  HR: 58 (31 Mar 2020 06:30) (55 - 67)  BP: 116/65 (31 Mar 2020 06:30) (101/59 - 116/65)  BP(mean): --  RR: 18 (31 Mar 2020 08:08) (18 - 18)  SpO2: 95% (31 Mar 2020 08:08) (95% - 97%)    LABS:                        10.6   4.55  )-----------( 364      ( 31 Mar 2020 06:14 )             30.7     03-31    144  |  109  |  15  ----------------------------<  95  4.8   |  23  |  0.9    Ca    8.7      31 Mar 2020 06:14  Mg     2.1     03-31    TPro  6.6  /  Alb  3.7  /  TBili  0.5  /  DBili  x   /  AST  21  /  ALT  14  /  AlkPhos  121<H>  03-31      Culture - Urine (collected 28 Mar 2020 23:40)  Source: .Urine Clean Catch (Midstream)  Final Report (30 Mar 2020 07:31):    <10,000 CFU/mL Normal Urogenital Sofia    Culture - Blood (collected 28 Mar 2020 21:55)  Source: .Blood Blood-Peripheral  Preliminary Report (30 Mar 2020 05:02):    No growth to date.    Culture - Blood (collected 28 Mar 2020 21:55)  Source: .Blood Blood-Peripheral  Gram Stain (29 Mar 2020 21:47):    Growth in aerobic bottle: Gram Positive Cocci in Clusters  Final Report (30 Mar 2020 17:57):    Growth in aerobic bottle: Staphylococcus hominis    Coag Negative Staphylococcus    Single set isolate, possible contaminant. Contact    Microbiology if susceptibility testing clinically    indicated.    ***Blood Panel PCR results on this specimen are available    approximately 3 hours after the Gram stain result.***    Gram stain, PCR, and/or culture results may not always    correspond due to difference in methodologies.    ************************************************************    This PCR assay was performed using [employer].    The following targets are tested for: Enterococcus,    vancomycin resistant enterococci, Listeria monocytogenes,    coagulase negative staphylococci, S. aureus,    methicillin resistant S. aureus, Streptococcus agalactiae    (Group B), S. pneumoniae, S. pyogenes (Group A),    Acinetobacter baumannii, Enterobacter cloacae, E. coli,    Klebsiella oxytoca, K. pneumoniae, Proteus sp.,    Serratia marcescens, Haemophilus influenzae,    Neisseria meningitidis, Pseudomonas aeruginosa, Candida    albicans, C. glabrata, C krusei, C parapsilosis,    C. tropicalis and the KPC resistance gene.  Organism: Blood Culture PCR (30 Mar 2020 17:57)  Organism: Blood Culture PCR (30 Mar 2020 17:57)      RADIOLOGY:    < from: Xray Chest 1 View- PORTABLE-Urgent (03.30.20 @ 11:04) >  Impression:      Peripheral lung opacities are stable.    < end of copied text >      < from: Xray Pelvis AP only (03.29.20 @ 09:02) >  Impression:    No evidence of acute fracture.    < end of copied text >      < from: Xray Chest 1 View-PORTABLE IMMEDIATE (03.28.20 @ 22:48) >  Impression:      New predominantly right-sided peripheral airspace opacities suspicious for a viral pneumonia in the appropriate clinical setting.    No evidence of acute traumatic pathology.    < end of copied text >      <(03.28.20 @ 22:18) >  EXAM:  CT ABDOMEN AND PELVIS IC        EXAM:  CT CHEST IC          IMPRESSION:    Numerous bilateral peripheral groundglass patchy opacities and nodules. This is suspicious for viral pneumonia    Spiculated solid nodule in the left lower lobe measuring up to 10 mm. Further evaluation with PET CT and soft tissue sampling may be of benefit when patient is clinically able.    No evidence of acute traumatic injury to the chest, abdomen or pelvis.    Subacute/chronic fractures of the left eighth and 12th ribs.    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    < end of copied text >      < from: CT Cervical Spine No Cont (03.28.20 @ 22:18) >  IMPRESSION:    No acute fracture or significant subluxation of the cervical spine.      < end of copied text >      < from: CT Head No Cont (03.28.20 @ 22:18) >  IMPRESSION:      No CT evidence for acute intracranial pathology.    < end of copied text >    PHYSICAL EXAM:    Completed by hospitalist.      ASSESSMENT & PLAN    80 year old M with PMH of CAD s/p stenting and HTN who was brought in by ambulance from home after two episodes of syncope earlier today. EMS found him to be hypotensive in the 80s.  In the ED, trauma workup negative.  BP 98/64 and vitals otherwise stable.  Patient resuscitated with 1.5 L fluid and responded well (BP improved).  B/l peripheral ground glass opacities noted on CT chest and COVID-19 was sent.    #Ground glass opacities on CT chest- COVID positive  - 3/30 CXR with stable bilateral peripheral opacities. 3/31 appears stable from prior- f/u official read  - satting 95% on room air today  -remains afebrile, Hb slowly trending down?, labs otherwise WNL  -monitor AM labs  -blood cx with coag neg staph- likely contaminant; repeat cx pending    #Syncope, fall- likely secondary to COVID infection  -trauma workup negative   -trops neg x2  -orthostatics neg    #10 mm nodule in LLL seen on CT chest  -incidental finding, follow up with pulm outpatient    #HTN  -continue home meds    #CAD s/p stenting  -continue home meds  -follows with cardiologist, Dr. Pack    DASH Diet  DVT ppx  FULL CODE  Dispo: pt would like to go to rehab, awaiting PT eval

## 2020-03-31 NOTE — PHYSICAL THERAPY INITIAL EVALUATION ADULT - GAIT DISTANCE, PT EVAL
standing only for 20 seconds, pt c/o of feeling lightheaded and woozy. BP seated 95/58, HR 74 bpm, SPO2 on room air 98%.  Pt reported feeling better, tolerated standing for 5 seconds with dizziness again. BP 87/59. Tomasa BLANCAS made aware. standing only for 20 seconds, pt c/o of feeling lightheaded and woozy. BP seated 95/58, HR 74 bpm, SPO2 on room air 98%.  Pt reported feeling better, tolerated standing for 5 seconds with dizziness again. BP 87/59. Tomasa BLANCAS made aware. Pt returned to bed 113/62, reported feeling better

## 2020-04-01 ENCOUNTER — TRANSCRIPTION ENCOUNTER (OUTPATIENT)
Age: 80
End: 2020-04-01

## 2020-04-01 VITALS
RESPIRATION RATE: 18 BRPM | DIASTOLIC BLOOD PRESSURE: 65 MMHG | SYSTOLIC BLOOD PRESSURE: 138 MMHG | HEART RATE: 70 BPM | TEMPERATURE: 99 F

## 2020-04-01 LAB
ALBUMIN SERPL ELPH-MCNC: 3.8 G/DL — SIGNIFICANT CHANGE UP (ref 3.5–5.2)
ALP SERPL-CCNC: 124 U/L — HIGH (ref 30–115)
ALT FLD-CCNC: 15 U/L — SIGNIFICANT CHANGE UP (ref 0–41)
ANION GAP SERPL CALC-SCNC: 11 MMOL/L — SIGNIFICANT CHANGE UP (ref 7–14)
AST SERPL-CCNC: 21 U/L — SIGNIFICANT CHANGE UP (ref 0–41)
BASOPHILS # BLD AUTO: 0.01 K/UL — SIGNIFICANT CHANGE UP (ref 0–0.2)
BASOPHILS NFR BLD AUTO: 0.2 % — SIGNIFICANT CHANGE UP (ref 0–1)
BILIRUB SERPL-MCNC: 0.5 MG/DL — SIGNIFICANT CHANGE UP (ref 0.2–1.2)
BUN SERPL-MCNC: 15 MG/DL — SIGNIFICANT CHANGE UP (ref 10–20)
CALCIUM SERPL-MCNC: 9 MG/DL — SIGNIFICANT CHANGE UP (ref 8.5–10.1)
CHLORIDE SERPL-SCNC: 107 MMOL/L — SIGNIFICANT CHANGE UP (ref 98–110)
CO2 SERPL-SCNC: 24 MMOL/L — SIGNIFICANT CHANGE UP (ref 17–32)
CREAT SERPL-MCNC: 0.8 MG/DL — SIGNIFICANT CHANGE UP (ref 0.7–1.5)
EOSINOPHIL # BLD AUTO: 0.03 K/UL — SIGNIFICANT CHANGE UP (ref 0–0.7)
EOSINOPHIL NFR BLD AUTO: 0.6 % — SIGNIFICANT CHANGE UP (ref 0–8)
GLUCOSE SERPL-MCNC: 126 MG/DL — HIGH (ref 70–99)
HCT VFR BLD CALC: 32.3 % — LOW (ref 42–52)
HGB BLD-MCNC: 10.8 G/DL — LOW (ref 14–18)
IMM GRANULOCYTES NFR BLD AUTO: 0.4 % — HIGH (ref 0.1–0.3)
LYMPHOCYTES # BLD AUTO: 1.8 K/UL — SIGNIFICANT CHANGE UP (ref 1.2–3.4)
LYMPHOCYTES # BLD AUTO: 35.5 % — SIGNIFICANT CHANGE UP (ref 20.5–51.1)
MAGNESIUM SERPL-MCNC: 2 MG/DL — SIGNIFICANT CHANGE UP (ref 1.8–2.4)
MCHC RBC-ENTMCNC: 30.3 PG — SIGNIFICANT CHANGE UP (ref 27–31)
MCHC RBC-ENTMCNC: 33.4 G/DL — SIGNIFICANT CHANGE UP (ref 32–37)
MCV RBC AUTO: 90.7 FL — SIGNIFICANT CHANGE UP (ref 80–94)
MONOCYTES # BLD AUTO: 0.47 K/UL — SIGNIFICANT CHANGE UP (ref 0.1–0.6)
MONOCYTES NFR BLD AUTO: 9.3 % — SIGNIFICANT CHANGE UP (ref 1.7–9.3)
NEUTROPHILS # BLD AUTO: 2.74 K/UL — SIGNIFICANT CHANGE UP (ref 1.4–6.5)
NEUTROPHILS NFR BLD AUTO: 54 % — SIGNIFICANT CHANGE UP (ref 42.2–75.2)
NRBC # BLD: 0 /100 WBCS — SIGNIFICANT CHANGE UP (ref 0–0)
PLATELET # BLD AUTO: 357 K/UL — SIGNIFICANT CHANGE UP (ref 130–400)
POTASSIUM SERPL-MCNC: 4.5 MMOL/L — SIGNIFICANT CHANGE UP (ref 3.5–5)
POTASSIUM SERPL-SCNC: 4.5 MMOL/L — SIGNIFICANT CHANGE UP (ref 3.5–5)
PROT SERPL-MCNC: 6.7 G/DL — SIGNIFICANT CHANGE UP (ref 6–8)
RBC # BLD: 3.56 M/UL — LOW (ref 4.7–6.1)
RBC # FLD: 12.5 % — SIGNIFICANT CHANGE UP (ref 11.5–14.5)
SODIUM SERPL-SCNC: 142 MMOL/L — SIGNIFICANT CHANGE UP (ref 135–146)
WBC # BLD: 5.07 K/UL — SIGNIFICANT CHANGE UP (ref 4.8–10.8)
WBC # FLD AUTO: 5.07 K/UL — SIGNIFICANT CHANGE UP (ref 4.8–10.8)

## 2020-04-01 PROCEDURE — 99239 HOSP IP/OBS DSCHRG MGMT >30: CPT

## 2020-04-01 RX ORDER — METOPROLOL TARTRATE 50 MG
1 TABLET ORAL
Qty: 60 | Refills: 0
Start: 2020-04-01 | End: 2020-05-30

## 2020-04-01 RX ORDER — METOPROLOL TARTRATE 50 MG
1 TABLET ORAL
Qty: 0 | Refills: 0 | DISCHARGE
Start: 2020-04-01

## 2020-04-01 RX ORDER — METOPROLOL TARTRATE 50 MG
1 TABLET ORAL
Qty: 0 | Refills: 0 | DISCHARGE

## 2020-04-01 RX ADMIN — ESCITALOPRAM OXALATE 20 MILLIGRAM(S): 10 TABLET, FILM COATED ORAL at 11:10

## 2020-04-01 RX ADMIN — ENOXAPARIN SODIUM 40 MILLIGRAM(S): 100 INJECTION SUBCUTANEOUS at 05:25

## 2020-04-01 RX ADMIN — CLOPIDOGREL BISULFATE 75 MILLIGRAM(S): 75 TABLET, FILM COATED ORAL at 11:10

## 2020-04-01 RX ADMIN — CHLORHEXIDINE GLUCONATE 1 APPLICATION(S): 213 SOLUTION TOPICAL at 05:24

## 2020-04-01 RX ADMIN — ISOSORBIDE MONONITRATE 30 MILLIGRAM(S): 60 TABLET, EXTENDED RELEASE ORAL at 11:09

## 2020-04-01 RX ADMIN — FINASTERIDE 5 MILLIGRAM(S): 5 TABLET, FILM COATED ORAL at 11:10

## 2020-04-01 RX ADMIN — Medication 10 MILLIGRAM(S): at 11:09

## 2020-04-01 RX ADMIN — Medication 50 MILLIGRAM(S): at 05:30

## 2020-04-01 NOTE — PROGRESS NOTE ADULT - SUBJECTIVE AND OBJECTIVE BOX
no chest pain   no sob   no diarrhea   no cough   no fever     Vital Signs Last 24 Hrs  T(C): 35.8 (01 Apr 2020 05:35), Max: 35.8 (01 Apr 2020 05:35)  T(F): 96.4 (01 Apr 2020 05:35), Max: 96.4 (01 Apr 2020 05:35)  HR: 88 (01 Apr 2020 05:35) (67 - 88)  BP: 128/65 (01 Apr 2020 05:35) (104/56 - 128/65)  BP(mean): --  RR: 18 (01 Apr 2020 08:31) (18 - 18)  SpO2: 98% (01 Apr 2020 08:31) (95% - 98%)                          10.8   5.07  )-----------( 357      ( 01 Apr 2020 09:03 )             32.3     04-01    142  |  107  |  15  ----------------------------<  126<H>  4.5   |  24  |  0.8    Ca    9.0      01 Apr 2020 09:03  Mg     2.0     04-01    TPro  6.7  /  Alb  3.8  /  TBili  0.5  /  DBili  x   /  AST  21  /  ALT  15  /  AlkPhos  124<H>  04-01    LIVER FUNCTIONS - ( 01 Apr 2020 09:03 )  Alb: 3.8 g/dL / Pro: 6.7 g/dL / ALK PHOS: 124 U/L / ALT: 15 U/L / AST: 21 U/L / GGT: x                   Culture - Blood (collected 30 Mar 2020 18:01)  Source: .Blood None  Preliminary Report (01 Apr 2020 01:02):    No growth to date.    MEDICATIONS  (STANDING):  chlorhexidine 4% Liquid 1 Application(s) Topical <User Schedule>  clopidogrel Tablet 75 milliGRAM(s) Oral daily  enoxaparin Injectable 40 milliGRAM(s) SubCutaneous two times a day  escitalopram 20 milliGRAM(s) Oral daily  finasteride 5 milliGRAM(s) Oral daily  isosorbide   mononitrate ER Tablet (IMDUR) 30 milliGRAM(s) Oral daily  metoprolol succinate ER 50 milliGRAM(s) Oral daily  oxybutynin 10 milliGRAM(s) Oral daily  simvastatin 40 milliGRAM(s) Oral at bedtime  tamsulosin 0.4 milliGRAM(s) Oral at bedtime    MEDICATIONS  (PRN):  acetaminophen   Tablet .. 650 milliGRAM(s) Oral every 6 hours PRN Temp greater or equal to 38C (100.4F)

## 2020-04-01 NOTE — PROGRESS NOTE ADULT - ASSESSMENT
#diarrhea  secondary to COVID 19 causing hypovolemia causing syncope:   EKG unremarkable   no events on tele   diarrhea resolved   chest xray mild B/L opacities on room air sats 98%. not sob, no cough, no evidence of bacterial pna   not on plaquinil given that patient is not hypoxic       Coag neg staph blood culture contaminant repeat blood culture negative     rib fractures : pain control     CAD: had to decrease toprol to 50 for low bp and bradycardia which improved.  c/w home meds. stable from cardiac stand point     discharge to snf once bed available   spent 35 min on discharge

## 2020-04-01 NOTE — DISCHARGE NOTE NURSING/CASE MANAGEMENT/SOCIAL WORK - PATIENT PORTAL LINK FT
You can access the FollowMyHealth Patient Portal offered by [facility] by registering at the following website: [email or web address]. By joining [email or web address]’s FollowMyHealth portal, you will also be able to view your health information using other applications (apps) compatible with our system.

## 2020-04-01 NOTE — DISCHARGE NOTE PROVIDER - NSDCCPCAREPLAN_GEN_ALL_CORE_FT
PRINCIPAL DISCHARGE DIAGNOSIS  Diagnosis: Infection due to 2019 novel coronavirus  Assessment and Plan of Treatment: Coronavirus disease 2019 (COVID-19) is a respiratory illness  that can spread from person to person. The virus that causes  COVID-19 is a novel coronavirus that was first identified during  an investigation into an outbreak in [place], [place].  The virus that causes COVID-19 probably emerged from an  animal source, but is now spreading from person to person.  The virus is thought to spread mainly between people who  are in close contact with one another (within about 6 feet)  through respiratory droplets produced when an infected  person coughs or sneezes. It also may be possible that a person  can get COVID-19 by touching a surface or object that has  the virus on it and then touching their own mouth, nose, or  possibly their eyes, but this is not thought to be the main  way the virus spreads.  Please stay home and avoid contact with others for at least a week and follow government guidelines.   Patients with COVID-19 have had mild to severe respiratory  illness with symptoms of  • fever  • cough  • shortness of breath  People can help protect themselves from respiratory illness with  everyday preventive actions.    • Avoid close contact with people who are sick.  • Avoid touching your eyes, nose, and mouth with  unwashed hands.  • Wash your hands often with soap and water for at least 20   seconds. Use an alcohol-based hand  that contains at  least 60% alcohol if soap and water are not available.   Stay home when you are sick.  • Cover your cough or sneeze with a tissue, then throw the  tissue in the trash.  • Clean and disinfect frequently touched objects  and surfaces.  Call 911 and inform them you are covid positive before you decide to go to the emergency room if you have chest pain, difficulty breathing, high fevers, worsening of your symptoms, feel unwell, or have nausea and vomiting.

## 2020-04-01 NOTE — DISCHARGE NOTE PROVIDER - HOSPITAL COURSE
80 year old M with PMH of CAD s/p stenting and HTN who was brought in by ambulance from home after two episodes of syncope earlier today.  Patient reports that he had been feeling weak earlier today and had one episode of nonbloody diarrhea.  Then, while walking to the bathroom, he became very dizzy and syncopized.  He came to, walked to his bedroom, and then passed out again.  He reports having fallen on his L side due to left sided pain when he came to.   He denies fever/chills, cough, SOB, abd pain, nausea/vomiting.    EMS found him to be hypotensive in the 80s. In the ED, trauma workup negative.  BP 98/64 and vitals otherwise stable.  Patient resuscitated with 1.5 L fluid and responded well (BP improved). B/l peripheral ground glass opacities noted on CT chest and COVID-19 was sent.        Pt positive for COVID-19, not treated with plaquenil as he was not hypoxic. Pt had some weakness and difficulty walking but declined SNF and wanted to go home instead. Pt medically stable and cleared for DC home.

## 2020-04-01 NOTE — DISCHARGE NOTE PROVIDER - CARE PROVIDER_API CALL
Akil Pack)  Cardiology; Internal Medicine; Interventional Cardiology  [address] [place] [address]  Phone: [phone]  Fax: [phone]  Follow Up Time:

## 2020-04-01 NOTE — CHART NOTE - NSCHARTNOTEFT_GEN_A_CORE
<<<RESIDENT DISCHARGE NOTE>>>     JUDI HANEY  [id number]    VITAL SIGNS:  T(F): 96.4 (04-01-20 @ 05:35), Max: 96.4 (04-01-20 @ 05:35)  HR: 88 (04-01-20 @ 05:35)  BP: 128/65 (04-01-20 @ 05:35)  SpO2: 98% (04-01-20 @ 08:31)      PHYSICAL EXAMINATION:    Completed by hospitalist.    TEST RESULTS:                        10.8   5.07  )-----------( 357      ( 01 Apr 2020 09:03 )             32.3       04-01    142  |  107  |  15  ----------------------------<  126<H>  4.5   |  24  |  0.8    Ca    9.0      01 Apr 2020 09:03  Mg     2.0     04-01    TPro  6.7  /  Alb  3.8  /  TBili  0.5  /  DBili  x   /  AST  21  /  ALT  15  /  AlkPhos  124<H>  04-01      FINAL DISCHARGE INTERVIEW:  Resident(s) Present: (Name:_____________), RN Present: (Name:  ___________)    DISCHARGE MEDICATION RECONCILIATION  reviewed with Attending (Name:__Dr. Elder_)    DISPOSITION:   [x] Home,    [  ] Home with Visiting Nursing Services,   [    ]  SNF/ NH,    [   ] Acute Rehab (4A),   [   ] Other (Specify:_________)

## 2020-04-01 NOTE — DISCHARGE NOTE PROVIDER - NSDCMRMEDTOKEN_GEN_ALL_CORE_FT
Flomax 0.4 mg oral capsule: 1 cap(s) orally once a day  isosorbide mononitrate 30 mg oral tablet, extended release: 1 tab(s) orally once a day (in the morning)  Lexapro 20 mg oral tablet: 1 tab(s) orally once a day  metoprolol succinate 50 mg oral tablet, extended release: 1 tab(s) orally once a day  metoprolol succinate 50 mg oral tablet, extended release: 1 tab(s) orally once a day  oxybutynin 10 mg/24 hr oral tablet, extended release: 1 tab(s) orally once a day  Plavix 75 mg oral tablet: 1 tab(s) orally once a day  Proscar 5 mg oral tablet: 1 tab(s) orally once a day  simvastatin 40 mg oral tablet: 1 tab(s) orally once a day (at bedtime) Flomax 0.4 mg oral capsule: 1 cap(s) orally once a day  isosorbide mononitrate 30 mg oral tablet, extended release: 1 tab(s) orally once a day (in the morning)  Lexapro 20 mg oral tablet: 1 tab(s) orally once a day  metoprolol succinate 50 mg oral tablet, extended release: 1 tab(s) orally once a day  oxybutynin 10 mg/24 hr oral tablet, extended release: 1 tab(s) orally once a day  Plavix 75 mg oral tablet: 1 tab(s) orally once a day  Proscar 5 mg oral tablet: 1 tab(s) orally once a day  simvastatin 40 mg oral tablet: 1 tab(s) orally once a day (at bedtime)

## 2020-04-02 LAB
CULTURE RESULTS: SIGNIFICANT CHANGE UP
SPECIMEN SOURCE: SIGNIFICANT CHANGE UP

## 2020-04-03 DIAGNOSIS — R55 SYNCOPE AND COLLAPSE: ICD-10-CM

## 2020-04-03 DIAGNOSIS — I95.1 ORTHOSTATIC HYPOTENSION: ICD-10-CM

## 2020-04-03 DIAGNOSIS — Z87.828 PERSONAL HISTORY OF OTHER (HEALED) PHYSICAL INJURY AND TRAUMA: ICD-10-CM

## 2020-04-03 DIAGNOSIS — R91.1 SOLITARY PULMONARY NODULE: ICD-10-CM

## 2020-04-03 DIAGNOSIS — Z87.891 PERSONAL HISTORY OF NICOTINE DEPENDENCE: ICD-10-CM

## 2020-04-03 DIAGNOSIS — I25.10 ATHEROSCLEROTIC HEART DISEASE OF NATIVE CORONARY ARTERY WITHOUT ANGINA PECTORIS: ICD-10-CM

## 2020-04-03 DIAGNOSIS — J12.89 OTHER VIRAL PNEUMONIA: ICD-10-CM

## 2020-04-03 DIAGNOSIS — E86.1 HYPOVOLEMIA: ICD-10-CM

## 2020-04-03 DIAGNOSIS — I10 ESSENTIAL (PRIMARY) HYPERTENSION: ICD-10-CM

## 2020-04-03 DIAGNOSIS — R19.7 DIARRHEA, UNSPECIFIED: ICD-10-CM

## 2020-04-03 DIAGNOSIS — Z95.5 PRESENCE OF CORONARY ANGIOPLASTY IMPLANT AND GRAFT: ICD-10-CM

## 2020-04-03 DIAGNOSIS — J96.01 ACUTE RESPIRATORY FAILURE WITH HYPOXIA: ICD-10-CM

## 2020-04-04 LAB
CULTURE RESULTS: SIGNIFICANT CHANGE UP
SPECIMEN SOURCE: SIGNIFICANT CHANGE UP

## 2020-04-12 ENCOUNTER — FORM ENCOUNTER (OUTPATIENT)
Age: 80
End: 2020-04-12

## 2020-05-27 ENCOUNTER — FORM ENCOUNTER (OUTPATIENT)
Age: 80
End: 2020-05-27

## 2020-06-03 ENCOUNTER — FORM ENCOUNTER (OUTPATIENT)
Age: 80
End: 2020-06-03

## 2020-06-16 VITALS — HEIGHT: 69 IN | BODY MASS INDEX: 32.58 KG/M2 | WEIGHT: 220 LBS

## 2020-06-16 PROBLEM — Z00.00 ENCOUNTER FOR PREVENTIVE HEALTH EXAMINATION: Status: ACTIVE | Noted: 2020-06-16

## 2020-06-22 ENCOUNTER — APPOINTMENT (OUTPATIENT)
Dept: CARDIOLOGY | Facility: CLINIC | Age: 80
End: 2020-06-22
Payer: MEDICARE

## 2020-06-22 VITALS
SYSTOLIC BLOOD PRESSURE: 124 MMHG | DIASTOLIC BLOOD PRESSURE: 70 MMHG | BODY MASS INDEX: 36.29 KG/M2 | HEIGHT: 69 IN | WEIGHT: 245 LBS | TEMPERATURE: 98.2 F | HEART RATE: 75 BPM

## 2020-06-22 DIAGNOSIS — Z82.49 FAMILY HISTORY OF ISCHEMIC HEART DISEASE AND OTHER DISEASES OF THE CIRCULATORY SYSTEM: ICD-10-CM

## 2020-06-22 DIAGNOSIS — G47.33 OBSTRUCTIVE SLEEP APNEA (ADULT) (PEDIATRIC): ICD-10-CM

## 2020-06-22 DIAGNOSIS — Z78.9 OTHER SPECIFIED HEALTH STATUS: ICD-10-CM

## 2020-06-22 DIAGNOSIS — Z80.0 FAMILY HISTORY OF MALIGNANT NEOPLASM OF DIGESTIVE ORGANS: ICD-10-CM

## 2020-06-22 DIAGNOSIS — Z87.891 PERSONAL HISTORY OF NICOTINE DEPENDENCE: ICD-10-CM

## 2020-06-22 DIAGNOSIS — F43.10 POST-TRAUMATIC STRESS DISORDER, UNSPECIFIED: ICD-10-CM

## 2020-06-22 DIAGNOSIS — I25.10 ATHEROSCLEROTIC HEART DISEASE OF NATIVE CORONARY ARTERY W/OUT ANGINA PECTORIS: ICD-10-CM

## 2020-06-22 DIAGNOSIS — Z95.5 PRESENCE OF CORONARY ANGIOPLASTY IMPLANT AND GRAFT: ICD-10-CM

## 2020-06-22 PROCEDURE — 99204 OFFICE O/P NEW MOD 45 MIN: CPT

## 2020-06-22 PROCEDURE — 93000 ELECTROCARDIOGRAM COMPLETE: CPT | Mod: 59

## 2020-06-22 PROCEDURE — 93228 REMOTE 30 DAY ECG REV/REPORT: CPT

## 2020-06-22 RX ORDER — OXYBUTYNIN CHLORIDE 10 MG/1
10 TABLET, EXTENDED RELEASE ORAL DAILY
Refills: 0 | Status: ACTIVE | COMMUNITY

## 2020-06-22 RX ORDER — ISOSORBIDE DINITRATE 30 MG/1
30 TABLET ORAL DAILY
Refills: 0 | Status: ACTIVE | COMMUNITY

## 2020-06-22 RX ORDER — FINASTERIDE 5 MG/1
5 TABLET, FILM COATED ORAL DAILY
Refills: 0 | Status: ACTIVE | COMMUNITY

## 2020-06-22 RX ORDER — TAMSULOSIN HYDROCHLORIDE 0.4 MG/1
0.4 CAPSULE ORAL DAILY
Refills: 0 | Status: ACTIVE | COMMUNITY

## 2020-06-22 NOTE — HISTORY OF PRESENT ILLNESS
[FreeTextEntry1] : Referring Physician: Dr. Akil Pack\par Pulmonary: Dr. Jaquez\par \par elderly man with hypertension, CAD, hyperlipidemia, diagnosed with new-onset atrial fibrillation in May 28, 2020; complaining of dyspnea of exertion; presents today in sinus rhythm\par Took Multaq for one week, then stopped due to concerns over long QT\par Inquiring about catheter ablation;\par He has no chest pain, no shortness of breath at rest, no orthopnea, no PND. He denies dizziness, lightheadedness and syncope. He has mild to moderate exertional symptoms. He presents for evaluation.\par \par \par CARDIAC TESTING:\par ECG (6/22/2020): sinus rhythm at 75 bpm, no significant ST/T abnormalities\par MCT (5/28/2020 to 5/30/2020): AF 8 hours \par Echo (5/28/2020): EF 65%, concentric LVH

## 2020-06-22 NOTE — DISCUSSION/SUMMARY
[FreeTextEntry1] : Mr. Jose Segal is a pleasant 80 year-old man with hypertension, hyperlipidemia, CAD s/p PCI, TIANA on CPAP, referred for new-onset Atrial Fibrillation. \par \par His CHADSVASC score is 4. He is on Eliquis, aspirin and Plavix. I recommend to stop Aspirin as patient has no PCI in last 12 months;\par \par Patient was prescribed Multaq, but was stopped due to concerns of interaction with quetiapine and lexapro and QT prolongation. I recommend to treat with Metoprolol for now. I recommend to avoid Multaq or other anti-arrhythmics;\par \par I recommend an event monitor MCOT to assess AF burden on Metoprolol; \par \par The management strategies for atrial fibrillation were discussed focusing on the issues of stroke prevention, heart rate control and rhythm control. The options of rate control, antiarrhythmic drug therapy, and electrophysiologic testing and catheter ablation therapy were discussed at length. \par \par On follow-up visit, will discuss catheter ablation in view of AF burden.\par \par I discussed with patient plan of care in great details. I answered all his questions to his satisfaction. Patient was pleased with the visit.\par \par Patient will follow with me in 2 months’ time. Please do not hesitate to contact me at [phone] if you have any further questions regarding this patient care.\par \par

## 2020-06-22 NOTE — PHYSICAL EXAM
[General Appearance - Well Developed] : well developed [Normal Appearance] : normal appearance [Well Groomed] : well groomed [General Appearance - Well Nourished] : well nourished [No Deformities] : no deformities [General Appearance - In No Acute Distress] : no acute distress [Normal Conjunctiva] : the conjunctiva exhibited no abnormalities [Eyelids - No Xanthelasma] : the eyelids demonstrated no xanthelasmas [Normal Oral Mucosa] : normal oral mucosa [No Oral Pallor] : no oral pallor [No Oral Cyanosis] : no oral cyanosis [Normal Jugular Venous A Waves Present] : normal jugular venous A waves present [Normal Jugular Venous V Waves Present] : normal jugular venous V waves present [No Jugular Venous Leon A Waves] : no jugular venous leon A waves [Heart Rate And Rhythm] : heart rate and rhythm were normal [Heart Sounds] : normal S1 and S2 [Murmurs] : no murmurs present [Respiration, Rhythm And Depth] : normal respiratory rhythm and effort [Exaggerated Use Of Accessory Muscles For Inspiration] : no accessory muscle use [Auscultation Breath Sounds / Voice Sounds] : lungs were clear to auscultation bilaterally [Abdomen Soft] : soft [Abdomen Tenderness] : non-tender [Abdomen Mass (___ Cm)] : no abdominal mass palpated [Gait - Sufficient For Exercise Testing] : the gait was sufficient for exercise testing [Abnormal Walk] : normal gait [Nail Clubbing] : no clubbing of the fingernails [Cyanosis, Localized] : no localized cyanosis [Petechial Hemorrhages (___cm)] : no petechial hemorrhages [Skin Color & Pigmentation] : normal skin color and pigmentation [] : no rash [No Venous Stasis] : no venous stasis [Skin Lesions] : no skin lesions [No Skin Ulcers] : no skin ulcer [No Xanthoma] : no  xanthoma was observed [Oriented To Time, Place, And Person] : oriented to person, place, and time [Affect] : the affect was normal [Mood] : the mood was normal [No Anxiety] : not feeling anxious

## 2020-07-31 NOTE — ED PROVIDER NOTE - TOBACCO USE
Former smoker Tumor Depth: Less than 6mm from granular layer and no invasion beyond the subcutaneous fat

## 2020-08-23 ENCOUNTER — FORM ENCOUNTER (OUTPATIENT)
Age: 80
End: 2020-08-23

## 2020-08-25 ENCOUNTER — OUTPATIENT (OUTPATIENT)
Dept: OUTPATIENT SERVICES | Facility: HOSPITAL | Age: 80
LOS: 1 days | Discharge: HOME | End: 2020-08-25

## 2020-08-25 DIAGNOSIS — Z11.59 ENCOUNTER FOR SCREENING FOR OTHER VIRAL DISEASES: ICD-10-CM

## 2020-08-27 ENCOUNTER — OUTPATIENT (OUTPATIENT)
Dept: OUTPATIENT SERVICES | Facility: HOSPITAL | Age: 80
LOS: 1 days | Discharge: HOME | End: 2020-08-27
Payer: MEDICARE

## 2020-08-27 DIAGNOSIS — Z95.9 PRESENCE OF CARDIAC AND VASCULAR IMPLANT AND GRAFT, UNSPECIFIED: Chronic | ICD-10-CM

## 2020-08-27 LAB
ANION GAP SERPL CALC-SCNC: 9 MMOL/L — SIGNIFICANT CHANGE UP (ref 7–14)
BUN SERPL-MCNC: 12 MG/DL — SIGNIFICANT CHANGE UP (ref 10–20)
CALCIUM SERPL-MCNC: 8.5 MG/DL — SIGNIFICANT CHANGE UP (ref 8.5–10.1)
CHLORIDE SERPL-SCNC: 108 MMOL/L — SIGNIFICANT CHANGE UP (ref 98–110)
CO2 SERPL-SCNC: 22 MMOL/L — SIGNIFICANT CHANGE UP (ref 17–32)
CREAT SERPL-MCNC: 0.9 MG/DL — SIGNIFICANT CHANGE UP (ref 0.7–1.5)
GLUCOSE SERPL-MCNC: 88 MG/DL — SIGNIFICANT CHANGE UP (ref 70–99)
HCT VFR BLD CALC: 36.4 % — LOW (ref 42–52)
HGB BLD-MCNC: 11.7 G/DL — LOW (ref 14–18)
MCHC RBC-ENTMCNC: 29.2 PG — SIGNIFICANT CHANGE UP (ref 27–31)
MCHC RBC-ENTMCNC: 32.1 G/DL — SIGNIFICANT CHANGE UP (ref 32–37)
MCV RBC AUTO: 90.8 FL — SIGNIFICANT CHANGE UP (ref 80–94)
NRBC # BLD: 0 /100 WBCS — SIGNIFICANT CHANGE UP (ref 0–0)
PLATELET # BLD AUTO: 207 K/UL — SIGNIFICANT CHANGE UP (ref 130–400)
POTASSIUM SERPL-MCNC: 6.8 MMOL/L — CRITICAL HIGH (ref 3.5–5)
POTASSIUM SERPL-SCNC: 6.8 MMOL/L — CRITICAL HIGH (ref 3.5–5)
RBC # BLD: 4.01 M/UL — LOW (ref 4.7–6.1)
RBC # FLD: 13.1 % — SIGNIFICANT CHANGE UP (ref 11.5–14.5)
SODIUM SERPL-SCNC: 139 MMOL/L — SIGNIFICANT CHANGE UP (ref 135–146)
WBC # BLD: 4.82 K/UL — SIGNIFICANT CHANGE UP (ref 4.8–10.8)
WBC # FLD AUTO: 4.82 K/UL — SIGNIFICANT CHANGE UP (ref 4.8–10.8)

## 2020-08-27 PROCEDURE — 93458 L HRT ARTERY/VENTRICLE ANGIO: CPT | Mod: 26

## 2020-08-27 PROCEDURE — 93306 TTE W/DOPPLER COMPLETE: CPT | Mod: 26

## 2020-08-27 RX ORDER — TAMSULOSIN HYDROCHLORIDE 0.4 MG/1
1 CAPSULE ORAL
Qty: 0 | Refills: 0 | DISCHARGE

## 2020-08-27 RX ORDER — FINASTERIDE 5 MG/1
1 TABLET, FILM COATED ORAL
Qty: 0 | Refills: 0 | DISCHARGE

## 2020-08-27 RX ORDER — ISOSORBIDE MONONITRATE 60 MG/1
1 TABLET, EXTENDED RELEASE ORAL
Qty: 0 | Refills: 0 | DISCHARGE

## 2020-08-27 RX ORDER — ESCITALOPRAM OXALATE 10 MG/1
1 TABLET, FILM COATED ORAL
Qty: 0 | Refills: 0 | DISCHARGE

## 2020-08-27 RX ORDER — SIMVASTATIN 20 MG/1
1 TABLET, FILM COATED ORAL
Qty: 0 | Refills: 0 | DISCHARGE

## 2020-08-27 RX ORDER — CLOPIDOGREL BISULFATE 75 MG/1
1 TABLET, FILM COATED ORAL
Qty: 0 | Refills: 0 | DISCHARGE

## 2020-08-27 RX ORDER — APIXABAN 2.5 MG/1
1 TABLET, FILM COATED ORAL
Qty: 0 | Refills: 0 | DISCHARGE

## 2020-08-27 RX ORDER — OXYBUTYNIN CHLORIDE 5 MG
1 TABLET ORAL
Qty: 0 | Refills: 0 | DISCHARGE

## 2020-08-27 RX ORDER — ASPIRIN/CALCIUM CARB/MAGNESIUM 324 MG
1 TABLET ORAL
Qty: 0 | Refills: 0 | DISCHARGE

## 2020-08-27 NOTE — ASU PATIENT PROFILE, ADULT - PMH
CAD (coronary artery disease)    Hyperlipidemia    Hypertension    PVD (peripheral vascular disease)

## 2020-08-27 NOTE — CHART NOTE - NSCHARTNOTEFT_GEN_A_CORE
PRE-OP DIAGNOSIS:   Suspected CAD  NSVT on Holter  Stable Angina CC III    PROCEDURE:  [x] Coronary angiography  [] C  [] [facility]    Attending Physician: Dr. Pack  Fellow: Dr. Troncoso  Fellow: Dr. Lopez    ANESTHESIA TYPE:  [] General Anesthesia  [x] Sedation  [x] Local/Regional    ESTIMATED BLOOD LOSS:    10   mL    CONDITION  [] Critical  [] Serious  [] Fair  [x] Good    IV CONTRAST:    35  mL    FINDINGS  Left Heart Catheterization:  [] Normal Coronary Arteries  [] Luminal Irregularities  [x] Non-obstructive CAD  [] Significant CAD    ACCESS:  [x] right radial artery  [] right femoral artery    LEFT HEART CATHETERIZATION  Left main: Mild disease  LAD: Mild disease  Diag: Mild disease  Left Circumflex: Mild disease  OM: Mild disease  Right Coronary Artery: Minor disease  RPDA: Minor disease  PLS: Minor disease    INTERVENTION  SPECIMEN REMOVED: Not applicable  IMPLANTS: none    POST-OP DIAGNOSIS:  Non-Obstructive CAD    PLAN OF CARE:  [x] D/C Home today  [] D/C in AM  [] Return to In-patient bed  [] Admit for observation  [] Return for staged procedure:  [] CT Surgery consult called  [x] Continue aspirin, Resume Eliquis, Continue B-blocker & Statin therapy

## 2020-08-27 NOTE — H&P CARDIOLOGY - HISTORY OF PRESENT ILLNESS
HPI  79 yo male patient with PMHx of CAD s/p PCI 5 years ago, paroxysmal Afib on eliquis, presenting for elective cath.  Patient has been experiencing chest pain on exertion associated with shortness of breath for the last couple of months.  Stress test outpatient was positive.  Holter monitor for AF showed NSVT    REVIEW OF SYSTEMS:  CONSTITUTIONAL: No weakness, fevers or chills  EYES/ENT: No visual changes;  No vertigo or throat pain   NECK: No pain or stiffness  RESPIRATORY: No cough, wheezing, hemoptysis; SEE HPI  CARDIOVASCULAR: SEE HPI  GASTROINTESTINAL: No abdominal or epigastric pain. No nausea, vomiting, or hematemesis; No diarrhea or constipation. No melena or hematochezia.  GENITOURINARY: No dysuria, frequency or hematuria  NEUROLOGICAL: No numbness or weakness  SKIN: No itching, rashes      PHYSICAL EXAM:  GENERAL: NAD  HEAD:  Atraumatic, Normocephalic  EYES: conjunctiva and sclera clear  NECK: No JVD  CHEST/LUNG: Clear to auscultation bilaterally; No wheeze  HEART: Regular rate and rhythm; No murmurs  ABDOMEN: Soft, Nontender, Nondistended; Bowel sounds present  EXTREMITIES:  2+ Peripheral Pulses, No clubbing, cyanosis, or edema  NEUROLOGY:  A&Ox3, appropriate  SKIN: No rashes or lesions    RIGHT RADIAL ARTERY EVALUATION:  GALE TEST: [X] Negative          [] Positive  BARBEAU TEST: [X] Class A           [] Class B           [] Class C            [] Class D

## 2020-08-30 ENCOUNTER — FORM ENCOUNTER (OUTPATIENT)
Age: 80
End: 2020-08-30

## 2020-09-02 DIAGNOSIS — I10 ESSENTIAL (PRIMARY) HYPERTENSION: ICD-10-CM

## 2020-09-02 DIAGNOSIS — I20.8 OTHER FORMS OF ANGINA PECTORIS: ICD-10-CM

## 2020-09-02 DIAGNOSIS — I73.9 PERIPHERAL VASCULAR DISEASE, UNSPECIFIED: ICD-10-CM

## 2020-09-02 DIAGNOSIS — E78.5 HYPERLIPIDEMIA, UNSPECIFIED: ICD-10-CM

## 2020-09-02 DIAGNOSIS — I48.0 PAROXYSMAL ATRIAL FIBRILLATION: ICD-10-CM

## 2020-09-02 DIAGNOSIS — Z87.891 PERSONAL HISTORY OF NICOTINE DEPENDENCE: ICD-10-CM

## 2020-09-02 DIAGNOSIS — R07.89 OTHER CHEST PAIN: ICD-10-CM

## 2020-09-02 DIAGNOSIS — Z79.82 LONG TERM (CURRENT) USE OF ASPIRIN: ICD-10-CM

## 2020-09-02 DIAGNOSIS — I25.118 ATHEROSCLEROTIC HEART DISEASE OF NATIVE CORONARY ARTERY WITH OTHER FORMS OF ANGINA PECTORIS: ICD-10-CM

## 2020-09-02 DIAGNOSIS — Z79.01 LONG TERM (CURRENT) USE OF ANTICOAGULANTS: ICD-10-CM

## 2020-09-02 DIAGNOSIS — Z95.5 PRESENCE OF CORONARY ANGIOPLASTY IMPLANT AND GRAFT: ICD-10-CM

## 2020-09-02 DIAGNOSIS — Z82.49 FAMILY HISTORY OF ISCHEMIC HEART DISEASE AND OTHER DISEASES OF THE CIRCULATORY SYSTEM: ICD-10-CM

## 2020-09-21 ENCOUNTER — APPOINTMENT (OUTPATIENT)
Dept: CARDIOLOGY | Facility: CLINIC | Age: 80
End: 2020-09-21

## 2020-09-21 ENCOUNTER — FORM ENCOUNTER (OUTPATIENT)
Age: 80
End: 2020-09-21

## 2020-12-14 ENCOUNTER — FORM ENCOUNTER (OUTPATIENT)
Age: 80
End: 2020-12-14

## 2021-02-03 ENCOUNTER — FORM ENCOUNTER (OUTPATIENT)
Age: 81
End: 2021-02-03

## 2021-07-12 ENCOUNTER — FORM ENCOUNTER (OUTPATIENT)
Age: 81
End: 2021-07-12

## 2021-08-06 PROBLEM — I73.9 PERIPHERAL VASCULAR DISEASE, UNSPECIFIED: Chronic | Status: ACTIVE | Noted: 2020-08-27

## 2021-08-06 PROBLEM — E78.5 HYPERLIPIDEMIA, UNSPECIFIED: Chronic | Status: ACTIVE | Noted: 2020-08-27

## 2021-08-06 PROBLEM — I10 ESSENTIAL (PRIMARY) HYPERTENSION: Chronic | Status: ACTIVE | Noted: 2020-08-27

## 2021-08-06 PROBLEM — I25.10 ATHEROSCLEROTIC HEART DISEASE OF NATIVE CORONARY ARTERY WITHOUT ANGINA PECTORIS: Chronic | Status: ACTIVE | Noted: 2020-08-27

## 2021-08-14 ENCOUNTER — OUTPATIENT (OUTPATIENT)
Dept: OUTPATIENT SERVICES | Facility: HOSPITAL | Age: 81
LOS: 1 days | Discharge: HOME | End: 2021-08-14
Payer: MEDICARE

## 2021-08-14 DIAGNOSIS — Z95.9 PRESENCE OF CARDIAC AND VASCULAR IMPLANT AND GRAFT, UNSPECIFIED: Chronic | ICD-10-CM

## 2021-08-14 DIAGNOSIS — M17.11 UNILATERAL PRIMARY OSTEOARTHRITIS, RIGHT KNEE: ICD-10-CM

## 2021-08-14 DIAGNOSIS — M16.11 UNILATERAL PRIMARY OSTEOARTHRITIS, RIGHT HIP: ICD-10-CM

## 2021-08-14 PROCEDURE — 73700 CT LOWER EXTREMITY W/O DYE: CPT | Mod: 26,RT,MH

## 2021-08-23 ENCOUNTER — FORM ENCOUNTER (OUTPATIENT)
Age: 81
End: 2021-08-23

## 2021-10-03 ENCOUNTER — FORM ENCOUNTER (OUTPATIENT)
Age: 81
End: 2021-10-03

## 2021-12-05 ENCOUNTER — FORM ENCOUNTER (OUTPATIENT)
Age: 81
End: 2021-12-05

## 2022-04-07 ENCOUNTER — APPOINTMENT (OUTPATIENT)
Age: 82
End: 2022-04-07

## 2022-04-08 ENCOUNTER — APPOINTMENT (OUTPATIENT)
Age: 82
End: 2022-04-08
Payer: MEDICARE

## 2022-04-08 VITALS
WEIGHT: 231 LBS | HEIGHT: 69 IN | HEART RATE: 97 BPM | BODY MASS INDEX: 34.21 KG/M2 | DIASTOLIC BLOOD PRESSURE: 64 MMHG | RESPIRATION RATE: 14 BRPM | SYSTOLIC BLOOD PRESSURE: 104 MMHG

## 2022-04-08 DIAGNOSIS — E66.9 OBESITY, UNSPECIFIED: ICD-10-CM

## 2022-04-08 DIAGNOSIS — G47.33 OBSTRUCTIVE SLEEP APNEA (ADULT) (PEDIATRIC): ICD-10-CM

## 2022-04-08 DIAGNOSIS — C34.32 MALIGNANT NEOPLASM OF LOWER LOBE, LEFT BRONCHUS OR LUNG: ICD-10-CM

## 2022-04-08 PROCEDURE — 99214 OFFICE O/P EST MOD 30 MIN: CPT

## 2022-04-08 RX ORDER — METOPROLOL SUCCINATE 50 MG/1
50 TABLET, EXTENDED RELEASE ORAL DAILY
Refills: 1 | Status: COMPLETED | COMMUNITY
Start: 2020-06-22 | End: 2022-04-08

## 2022-04-08 RX ORDER — SIMVASTATIN 40 MG/1
40 TABLET, FILM COATED ORAL
Refills: 0 | Status: COMPLETED | COMMUNITY
End: 2022-04-08

## 2022-04-08 RX ORDER — CLOPIDOGREL BISULFATE 75 MG/1
75 TABLET, FILM COATED ORAL
Refills: 0 | Status: COMPLETED | COMMUNITY
End: 2022-04-08

## 2022-04-08 NOTE — ASSESSMENT
[FreeTextEntry1] : Assessment:\par TIANA\par Obesity\par \par PLAN:\par The patient is benefitting from the PAP device .\par New supplies ordered \par Weight loss discussed\par I stressed the need maintain compliance  with the PAP device.\par The patient is not to use an Ozone or UV sterilizer. \par F/U in 12 months\par \par The patient's PAP unit is  under recall. The patient has registered the PAP device.\par I discussed at length with the pt the pros and cons to continuing PAP device given the health risks compared to stopping the device. There can be extreme risks to stopping the PAP.\par The patient is not to use an Ozone or UV sterilizer.\par The pt will call the insurance then vendor to see next steps in obtaining a replacement PAP device.\par I will forward a Rx to assist with the transition to new PAP device.\par I discussed the proposed solutions from [facility] regarding the CPAP replacement.\par Patient had a recent lung resection for nodular density.  He is going to follow-up with his surgeons regarding this process\par

## 2022-04-08 NOTE — REASON FOR VISIT
[Follow-Up] : a follow-up visit [Sleep Apnea] : sleep apnea [[id number]_44] : Patient has a history obstructive sleep apnea syndrome.  He was doing very well with the machine until he has been doing recall.  He stopped using it immediately.  This was several months ago.  He now presents for ongoing discussion regarding the use of the CPAP [Obesity] : obesity

## 2022-05-04 ENCOUNTER — EMERGENCY (EMERGENCY)
Facility: HOSPITAL | Age: 82
LOS: 0 days | Discharge: HOME | End: 2022-05-04
Attending: EMERGENCY MEDICINE | Admitting: EMERGENCY MEDICINE
Payer: MEDICARE

## 2022-05-04 VITALS
TEMPERATURE: 98 F | OXYGEN SATURATION: 98 % | SYSTOLIC BLOOD PRESSURE: 79 MMHG | RESPIRATION RATE: 18 BRPM | HEART RATE: 67 BPM | HEIGHT: 68 IN | DIASTOLIC BLOOD PRESSURE: 62 MMHG

## 2022-05-04 VITALS
OXYGEN SATURATION: 100 % | SYSTOLIC BLOOD PRESSURE: 104 MMHG | HEART RATE: 78 BPM | RESPIRATION RATE: 18 BRPM | DIASTOLIC BLOOD PRESSURE: 56 MMHG

## 2022-05-04 DIAGNOSIS — I48.91 UNSPECIFIED ATRIAL FIBRILLATION: ICD-10-CM

## 2022-05-04 DIAGNOSIS — I95.9 HYPOTENSION, UNSPECIFIED: ICD-10-CM

## 2022-05-04 DIAGNOSIS — Z85.118 PERSONAL HISTORY OF OTHER MALIGNANT NEOPLASM OF BRONCHUS AND LUNG: ICD-10-CM

## 2022-05-04 DIAGNOSIS — R42 DIZZINESS AND GIDDINESS: ICD-10-CM

## 2022-05-04 DIAGNOSIS — Z95.5 PRESENCE OF CORONARY ANGIOPLASTY IMPLANT AND GRAFT: ICD-10-CM

## 2022-05-04 DIAGNOSIS — I10 ESSENTIAL (PRIMARY) HYPERTENSION: ICD-10-CM

## 2022-05-04 DIAGNOSIS — R55 SYNCOPE AND COLLAPSE: ICD-10-CM

## 2022-05-04 DIAGNOSIS — N40.0 BENIGN PROSTATIC HYPERPLASIA WITHOUT LOWER URINARY TRACT SYMPTOMS: ICD-10-CM

## 2022-05-04 DIAGNOSIS — I73.9 PERIPHERAL VASCULAR DISEASE, UNSPECIFIED: ICD-10-CM

## 2022-05-04 DIAGNOSIS — I25.10 ATHEROSCLEROTIC HEART DISEASE OF NATIVE CORONARY ARTERY WITHOUT ANGINA PECTORIS: ICD-10-CM

## 2022-05-04 DIAGNOSIS — Z20.822 CONTACT WITH AND (SUSPECTED) EXPOSURE TO COVID-19: ICD-10-CM

## 2022-05-04 DIAGNOSIS — Z79.82 LONG TERM (CURRENT) USE OF ASPIRIN: ICD-10-CM

## 2022-05-04 DIAGNOSIS — Z87.891 PERSONAL HISTORY OF NICOTINE DEPENDENCE: ICD-10-CM

## 2022-05-04 DIAGNOSIS — Z79.01 LONG TERM (CURRENT) USE OF ANTICOAGULANTS: ICD-10-CM

## 2022-05-04 DIAGNOSIS — Z95.9 PRESENCE OF CARDIAC AND VASCULAR IMPLANT AND GRAFT, UNSPECIFIED: Chronic | ICD-10-CM

## 2022-05-04 DIAGNOSIS — E78.5 HYPERLIPIDEMIA, UNSPECIFIED: ICD-10-CM

## 2022-05-04 DIAGNOSIS — Z90.2 ACQUIRED ABSENCE OF LUNG [PART OF]: ICD-10-CM

## 2022-05-04 LAB
ALBUMIN SERPL ELPH-MCNC: 3.7 G/DL — SIGNIFICANT CHANGE UP (ref 3.5–5.2)
ALP SERPL-CCNC: 167 U/L — HIGH (ref 30–115)
ALT FLD-CCNC: 6 U/L — SIGNIFICANT CHANGE UP (ref 0–41)
ANION GAP SERPL CALC-SCNC: 13 MMOL/L — SIGNIFICANT CHANGE UP (ref 7–14)
AST SERPL-CCNC: 13 U/L — SIGNIFICANT CHANGE UP (ref 0–41)
BASOPHILS # BLD AUTO: 0.02 K/UL — SIGNIFICANT CHANGE UP (ref 0–0.2)
BASOPHILS NFR BLD AUTO: 0.3 % — SIGNIFICANT CHANGE UP (ref 0–1)
BILIRUB SERPL-MCNC: 0.4 MG/DL — SIGNIFICANT CHANGE UP (ref 0.2–1.2)
BUN SERPL-MCNC: 18 MG/DL — SIGNIFICANT CHANGE UP (ref 10–20)
CALCIUM SERPL-MCNC: 8.4 MG/DL — LOW (ref 8.5–10.1)
CHLORIDE SERPL-SCNC: 111 MMOL/L — HIGH (ref 98–110)
CO2 SERPL-SCNC: 18 MMOL/L — SIGNIFICANT CHANGE UP (ref 17–32)
CREAT SERPL-MCNC: 1.3 MG/DL — SIGNIFICANT CHANGE UP (ref 0.7–1.5)
EGFR: 56 ML/MIN/1.73M2 — LOW
EOSINOPHIL # BLD AUTO: 0.03 K/UL — SIGNIFICANT CHANGE UP (ref 0–0.7)
EOSINOPHIL NFR BLD AUTO: 0.4 % — SIGNIFICANT CHANGE UP (ref 0–8)
GLUCOSE SERPL-MCNC: 85 MG/DL — SIGNIFICANT CHANGE UP (ref 70–99)
HCT VFR BLD CALC: 29.4 % — LOW (ref 42–52)
HGB BLD-MCNC: 9.8 G/DL — LOW (ref 14–18)
IMM GRANULOCYTES NFR BLD AUTO: 0.3 % — SIGNIFICANT CHANGE UP (ref 0.1–0.3)
LYMPHOCYTES # BLD AUTO: 2.28 K/UL — SIGNIFICANT CHANGE UP (ref 1.2–3.4)
LYMPHOCYTES # BLD AUTO: 32.7 % — SIGNIFICANT CHANGE UP (ref 20.5–51.1)
MAGNESIUM SERPL-MCNC: 1.9 MG/DL — SIGNIFICANT CHANGE UP (ref 1.8–2.4)
MCHC RBC-ENTMCNC: 29.5 PG — SIGNIFICANT CHANGE UP (ref 27–31)
MCHC RBC-ENTMCNC: 33.3 G/DL — SIGNIFICANT CHANGE UP (ref 32–37)
MCV RBC AUTO: 88.6 FL — SIGNIFICANT CHANGE UP (ref 80–94)
MONOCYTES # BLD AUTO: 0.81 K/UL — HIGH (ref 0.1–0.6)
MONOCYTES NFR BLD AUTO: 11.6 % — HIGH (ref 1.7–9.3)
NEUTROPHILS # BLD AUTO: 3.82 K/UL — SIGNIFICANT CHANGE UP (ref 1.4–6.5)
NEUTROPHILS NFR BLD AUTO: 54.7 % — SIGNIFICANT CHANGE UP (ref 42.2–75.2)
NRBC # BLD: 0 /100 WBCS — SIGNIFICANT CHANGE UP (ref 0–0)
NT-PROBNP SERPL-SCNC: 1440 PG/ML — HIGH (ref 0–300)
PLATELET # BLD AUTO: 187 K/UL — SIGNIFICANT CHANGE UP (ref 130–400)
POTASSIUM SERPL-MCNC: 4.4 MMOL/L — SIGNIFICANT CHANGE UP (ref 3.5–5)
POTASSIUM SERPL-SCNC: 4.4 MMOL/L — SIGNIFICANT CHANGE UP (ref 3.5–5)
PROT SERPL-MCNC: 6 G/DL — SIGNIFICANT CHANGE UP (ref 6–8)
RBC # BLD: 3.32 M/UL — LOW (ref 4.7–6.1)
RBC # FLD: 13.8 % — SIGNIFICANT CHANGE UP (ref 11.5–14.5)
SARS-COV-2 RNA SPEC QL NAA+PROBE: SIGNIFICANT CHANGE UP
SODIUM SERPL-SCNC: 142 MMOL/L — SIGNIFICANT CHANGE UP (ref 135–146)
TROPONIN T SERPL-MCNC: <0.01 NG/ML — SIGNIFICANT CHANGE UP
WBC # BLD: 6.98 K/UL — SIGNIFICANT CHANGE UP (ref 4.8–10.8)
WBC # FLD AUTO: 6.98 K/UL — SIGNIFICANT CHANGE UP (ref 4.8–10.8)

## 2022-05-04 PROCEDURE — 99285 EMERGENCY DEPT VISIT HI MDM: CPT | Mod: FS

## 2022-05-04 PROCEDURE — 93010 ELECTROCARDIOGRAM REPORT: CPT

## 2022-05-04 PROCEDURE — 71045 X-RAY EXAM CHEST 1 VIEW: CPT | Mod: 26

## 2022-05-04 RX ORDER — SODIUM CHLORIDE 9 MG/ML
1000 INJECTION, SOLUTION INTRAVENOUS ONCE
Refills: 0 | Status: COMPLETED | OUTPATIENT
Start: 2022-05-04 | End: 2022-05-04

## 2022-05-04 RX ADMIN — SODIUM CHLORIDE 1000 MILLILITER(S): 9 INJECTION, SOLUTION INTRAVENOUS at 16:02

## 2022-05-04 RX ADMIN — SODIUM CHLORIDE 1000 MILLILITER(S): 9 INJECTION, SOLUTION INTRAVENOUS at 17:27

## 2022-05-04 NOTE — ED PROVIDER NOTE - NS ED ROS FT
Constitutional: (+) lightheaded, (-) fever  Eyes/ENT: (-) blurry vision, (-) epistaxis  Cardiovascular: (+) near syncope, (-) chest pain  Respiratory: (-) cough, (-) shortness of breath  Gastrointestinal: (-) vomiting, (-) diarrhea  : (-) dysuria, (-) hematuria  Musculoskeletal: (-) neck pain, (-) back pain, (-) joint pain  Integumentary: (-) rash, (-) edema  Neurological: (-) headache, (-) altered mental status  Allergic/Immunologic: (-) pruritus

## 2022-05-04 NOTE — ED PROVIDER NOTE - NSFOLLOWUPINSTRUCTIONS_ED_ALL_ED_FT
Near-Syncope       Near-syncope is when you suddenly feel like you might pass out (faint), but you do not actually lose consciousness. This may also be referred to as presyncope. During an episode of near-syncope, you may:  •Feel dizzy, weak, or light-headed.      •Feel nauseous.      •See all white or all black in your field of vision, or see spots.      •Have cold, clammy skin.      This condition is caused by a sudden decrease in blood flow to the brain. This decrease can result from various causes, but most of those causes are not dangerous. However, near-syncope may be a sign of a serious medical problem, so it is important to seek medical care.      Follow these instructions at home:    Medicines     •Take over-the-counter and prescription medicines only as told by your health care provider.      •If you are taking blood pressure or heart medicine, get up slowly and take several minutes to sit and then stand. This can reduce dizziness.      General instructions     •Pay attention to any changes in your symptoms.      •Talk with your health care provider about your symptoms. You may need to have testing to understand the cause of your near-syncope.      •If you start to feel like you might faint, lie down right away and raise (elevate) your feet above the level of your heart. Breathe deeply and steadily. Wait until all of the symptoms have passed.      •Have someone stay with you until you feel stable.      • Do not drive, use machinery, or play sports until your health care provider says it is okay.      •Drink enough fluid to keep your urine pale yellow.      •Keep all follow-up visits as told by your health care provider. This is important.        Get help right away if you:    •Have a seizure.      •Have unusual pain in your chest, abdomen, or back.      •Faint once or repeatedly.      •Have a severe headache.      •Are bleeding from your mouth or rectum, or you have black or tarry stool.      •Have a very fast or irregular heartbeat (palpitations).      •Are confused.      •Have trouble walking.      •Have severe weakness.      •Have vision problems.      These symptoms may represent a serious problem that is an emergency. Do not wait to see if your symptoms will go away. Get medical help right away. Call your local emergency services (911 in the [place] Do not drive yourself to the hospital.       Summary    •Near-syncope is when you suddenly feel like you might pass out (faint), but you do not actually lose consciousness.      •This condition is caused by a sudden decrease in blood flow to the brain. This decrease can result from various causes, but most of those causes are not dangerous.      •Near-syncope may be a sign of a serious medical problem, so it is important to seek medical care.      This information is not intended to replace advice given to you by your health care provider. Make sure you discuss any questions you have with your health care provider.

## 2022-05-04 NOTE — ED ADULT NURSE NOTE - NSFALLRSKPASTHIST_ED_ALL_ED
Assessment:     Retained common bile duct stone s/p laparoscopic cholecystectomy    Plan:     No acute surgical intervention. Recommend GI consult for ERCP    Signed By: Antoinette Pereyra MD  [facility]  Office:  [phone]    September 13, 2019          General Surgery Consult    Subjective:      I was asked to see Mckinley Fairly by Dr. Jaxson Jacob for management of a retained common bile duct stone. The patient is a 63 y.o. female with a history acute cholecystitis s/p laparoscopic cholecystectomy on 8/11/19 by Dr. Solo Navarro. She was discharged home and was seen as an outpatient by Dr. Clark Ba. At that time she was having poor appetite, post-prandial nausea and emesis that was though to be post-operative. Symptoms persisted and so she was seen this week by her PCP who ordered a CMP that showed hyperbilirubinemia. A follow-up CT abdomen/pelvis was ordered that showed stranding/postsurgical change in the gallbladder fossa. A partially calcified calculus in the common bile duct where it traverses the head of the pancreas which measures 0.7 cm. A minimally distended common bile duct at 0.9 cm. She denies fevers, chills, jaundice, abdominal pain, incisional drainage. Past Medical History:   Diagnosis Date    Type 2 diabetes mellitus ([address]          Past Surgical History:   Procedure Laterality Date    HX CHOLECYSTECTOMY      HX LAP CHOLECYSTECTOMY  08/11/2019      Family History   Problem Relation Age of Onset    Diabetes Mother         type 2    Hypertension Mother     Diabetes Sister     Congenital heart defect Maternal Grandmother      Social History     Socioeconomic History    Marital status:      Spouse name: Not on file    Number of children: Not on file    Years of education: Not on file    Highest education level: Not on file   Tobacco Use    Smoking status: Never Smoker    Smokeless tobacco: Never Used   Substance and Sexual Activity    Alcohol use:  Yes Alcohol/week: 1.0 standard drinks     Types: 1 Glasses of wine per week     Comment: 1-3 drinks per month    Drug use: Never    Sexual activity: Yes     Partners: Male      Current Facility-Administered Medications   Medication Dose Route Frequency    sodium chloride 0.9 % bolus infusion 1,000 mL  1,000 mL IntraVENous ONCE     Current Outpatient Medications   Medication Sig    metFORMIN (GLUCOPHAGE) 500 mg tablet Take 1 Tab by mouth two (2) times daily (with meals).  valsartan (DIOVAN) 80 mg tablet Take 1 Tab by mouth daily.  ONE TOUCH DELICA 33 gauge misc CHECK FASTING BLOOD SUGARS DAILY    lancets misc Check fasting blood sugars daily.  glucose blood VI test strips (ASCENSIA AUTODISC VI, ONE TOUCH ULTRA TEST VI) strip Check fasting blood sugars daily. Contour Next one.     ONETOUCH ULTRAMINI monitoring kit use as directed      No Known Allergies    Review of Systems:     []     Unable to obtain  ROS due to  []    mental status change  []    sedated   []    intubated   [x]    Total of 12 system negative, unless specified below or in HPI:  Constitutional: negative fever, negative chills, negative weight loss  Eyes:   negative visual changes  ENT:   negative sore throat, tongue or lip swelling  Respiratory:  negative cough, negative dyspnea  Cards:  negative for chest pain, palpitations, lower extremity edema  GI:   negative for nausea, vomiting, diarrhea, and abdominal pain  :  negative for frequency, dysuria  Integument:  negative for rash and pruritus  Heme:  negative for easy bruising and gum/nose bleeding  Musculoskel: negative for myalgias,  back pain and muscle weakness  Neuro:  negative for headaches, dizziness, vertigo  Psych:  negative for feelings of anxiety, depression     Objective:        Patient Vitals for the past 8 hrs:   BP Temp Pulse Resp SpO2 Height Weight   19 1717 188/79 98.6 °F (37 °C) (!) 107 19 95 % 5' 2\" (1.575 m) 79.8 kg (176 lb)       Temp (24hrs), Av.6 °F (37 no °C), Min:98.6 °F (37 °C), Max:98.6 °F (37 °C)      Physical Exam:  General:  Alert, cooperative, no distress, appears stated age. Eyes:  Conjunctivae/corneas clear. PERRL, EOMs intact. Nose: Nares normal. Septum midline. Mucosa normal. No drainage or sinus tenderness. Mouth/Throat: Lips, mucosa, and tongue normal. Teeth and gums normal.   Neck: Supple, symmetrical, trachea midline, no adenopathy, thyroid: no enlargment/tenderness/nodules, no carotid bruit and no JVD. Back:   Symmetric, no curvature. ROM normal. No CVA tenderness. Lungs:   Clear to auscultation bilaterally. Heart:  Regular rate and rhythm, S1, S2 normal, no murmur, click, rub or gallop. Abdomen:   Soft, non-tender, non-distended. Well-healed laparoscopic incisions without infection or hernia. Extremities: Extremities normal, atraumatic, no cyanosis or edema. Pulses: 2+ and symmetric all extremities. Skin: Skin color, texture, turgor normal. No rashes or lesions   Lymph nodes: Cervical, supraclavicular, and axillary nodes normal.     Labs:   Recent Labs     09/13/19  1807   WBC 8.0   HGB 14.0   HCT 42.4        Recent Labs     09/13/19  1807      K 3.5   CL 96*   CO2 31   *   BUN 10   CREA 0.71   CA 9.1   ALB 3.0*   TBILI 1.8*   SGOT 96*   *     No results for input(s): INR in the last 72 hours.     No lab exists for component: INREXT

## 2022-05-04 NOTE — ED PROVIDER NOTE - PROGRESS NOTE DETAILS
ML: sign out to Dr. Mejia   pending labs, cxr Note authored by Dr. Fisher: 89-year-old male CAD, lung CA status post lobectomy, A. fib on Eliquis, BPH, took 2 extra Flomax today.  At 2 PM had an episodes of lightheadedness and dizziness with presyncopal symptoms.  With low blood pressure on EMS arrival.  On exam, nontoxic, no respiratory distress, no tachycardia, blood pressure MAP 70, pink conjunctive anicteric lungs clear abdomen soft no focal neurodeficits.  Labs pending.  Continue IV fluids.  Reassess NC: Pt symptoms improved, ate a meal, feels better, fluid responsive /62 after 1500ml of fluids. #[id number] nithin pt son, would like update when pt ready for discharge BG: Pt. s/o to me.  Pt reassessed, comfortable in bed. Pt pending re-eval. BG: Pt reassessed. Pt. stable and comfortable in bed. Endorses feeling well. .BK stable for discharge reassessed.  Vitals repeated.

## 2022-05-04 NOTE — ED PROVIDER NOTE - NSICDXPASTMEDICALHX_GEN_ALL_CORE_FT
PAST MEDICAL HISTORY:  CAD (coronary artery disease)     Hyperlipidemia     Hypertension     PVD (peripheral vascular disease)

## 2022-05-04 NOTE — ED PROVIDER NOTE - OBJECTIVE STATEMENT
82y M pmh CAD, Lung CA s/p lobectomy, HLD, Afib on Eliquis, HTN, PVD presents for hypotension. Pt states he was out to eat when he became lightheaded and felt he was going to syncopize with associated tunnel vision. No inciting or relieving factors. As per EMS pt systolic BP was 80 on scene. Now pt presents with resolution of symptoms. Denies fever, ha, cp, sob, weakness, numbness, dysuria, hematuria, n/v/d/c 82y M pmh CAD, Lung CA s/p lobectomy, HLD, Afib on Eliquis, HTN, PVD presents for hypotension. Pt states he was out to eat when he became lightheaded and felt he was going to syncopize with associated tunnel vision. No inciting or relieving factors. As per EMS pt systolic BP was 80 on scene. Now pt presents with resolution of symptoms. Pt admits to taking double his dose of Flomax and Multaq at noon prior to going to lunch. Denies fever, ha, cp, sob, weakness, numbness, dysuria, hematuria, n/v/d/c

## 2022-05-04 NOTE — ED PROVIDER NOTE - PHYSICAL EXAMINATION
CONST: NAD  EYES: PERRL, EOMI, Sclera and conjunctiva clear.   ENT: No nasal discharge. Oropharynx normal appearing.   NECK: Non-tender, no meningeal signs. normal ROM. supple   CARD: S1 S2; No jvd  RESP: Equal BS B/L, No wheezes, rhonchi or rales. No distress  GI: Soft, non-tender, non-distended. no cva tenderness. normal BS  MS: Normal ROM in all extremities. pulses 2 +. no calf tenderness or swelling  SKIN: Warm, dry, no acute rashes. Good turgor  NEURO: A&Ox3, No focal deficits. Strength 5/5 with no sensory deficits.

## 2022-05-04 NOTE — ED PROVIDER NOTE - NSICDXFAMILYHX_GEN_ALL_CORE_FT
FAMILY HISTORY:  Father  Still living? Unknown  Family history of coronary artery disease, Age at diagnosis: Age Unknown    Mother  Still living? Unknown  Family history of coronary artery disease, Age at diagnosis: Age Unknown

## 2022-05-04 NOTE — ED ADULT NURSE NOTE - NSSEPSISSUSPECTED_ED_A_ED
Pt found by roommate acting abnormal. Pt was altered, not following commands at first. Pt was diaphoretic and cool to touch. Pt with hx of diabetes, BG on scene 101. Pt became more alert, able to provide some information. Pt has been having abdominal pain and chest pain. Sinus loyd per EMS. Hx 4  sections, last one with complications healing.
No

## 2022-05-04 NOTE — ED PROVIDER NOTE - CARE PROVIDER_API CALL
Daren Lala)  Neurology  [address] [place] [address], [address]  [place] [address]  Phone: [phone]  Fax: [phone]  Follow Up Time: Urgent    Hansel Doan)  Medicine  [address] [place] [address]  Phone: [phone]  Fax: [phone]  Follow Up Time: Urgent

## 2022-05-04 NOTE — ED PROVIDER NOTE - PATIENT PORTAL LINK FT
You can access the FollowMyHealth Patient Portal offered by [facility] by registering at the following website: [email or web address]. By joining [employer]’s FollowMyHealth portal, you will also be able to view your health information using other applications (apps) compatible with our system.

## 2022-05-04 NOTE — ED PROVIDER NOTE - WR INTERPRETATION 1
CXR negative - No pneumothorax, No opacities, No free air, Left chest scar changes secondary to lobectomy

## 2022-05-04 NOTE — ED ADULT NURSE NOTE - NSIMPLEMENTINTERV_GEN_ALL_ED
Implemented All Fall Risk Interventions:  [place] to call system. Call bell, personal items and telephone within reach. Instruct patient to call for assistance. Room bathroom lighting operational. Non-slip footwear when patient is off stretcher. Physically safe environment: no spills, clutter or unnecessary equipment. Stretcher in lowest position, wheels locked, appropriate side rails in place. Provide visual cue, wrist band, yellow gown, etc. Monitor gait and stability. Monitor for mental status changes and reorient to person, place, and time. Review medications for side effects contributing to fall risk. Reinforce activity limits and safety measures with patient and family.

## 2022-05-04 NOTE — ED PROVIDER NOTE - PROVIDER TOKENS
PROVIDER:[TOKEN:[[id number]],FOLLOWUP:[Urgent]],PROVIDER:[TOKEN:[[id number]],FOLLOWUP:[Urgent]]

## 2022-05-04 NOTE — ED PROVIDER NOTE - ATTENDING APP SHARED VISIT CONTRIBUTION OF CARE
82-year-old male with past medical history of CAD, lung cancer status post lobectomy, hyperlipidemia, afib on eliquis, HTN, PVD presents to ED for presyncope.  Patient states he was at the restaurant he felt very lightheaded and dizzy and thought he was going to pass out. Patient did not fall. No head trauma.  No chest pain or shortness of breath no cough no recent illness.  EMS found patient's blood pressure to be 80 systolic.  Upon arrival patient states he feels asymptomatic.    Const: Well nourished, well developed, appears stated age  Eyes: PERRL, no conjunctival injection  HENT:  Neck supple without meningismus   CV: RRR, Warm, well-perfused extremities  RESP: CTA B/L, no tachypnea   GI: soft, non-tender, non-distended  MSK: No gross deformities appreciated  Skin: Warm, dry. No rashes  Neuro: Alert, CNs II-XII grossly intact. Sensation and motor function of extremities grossly intact.  Psych: Appropriate mood and affect.    will do labs, CXR, EKG and give fluids

## 2022-05-04 NOTE — ED PROVIDER NOTE - CLINICAL SUMMARY MEDICAL DECISION MAKING FREE TEXT BOX
Near syncope.  Likely related to extra dose of Flomax.  Labs and imaging reviewed.  IV fluids administered.

## 2022-05-05 NOTE — ED POST DISCHARGE NOTE - RESULT SUMMARY
CXR- LEFT BASE OPACITY. POSSIBLE LEFT APICAL PNEUMOTHORAX. CASE DISCUSSED WITH DR. WEAVER AND WANTS PATIENT TO RETURN TO ED FOR CHEST CT ASAP. SPOKE WITH PATIENT AGREES TO RETURN ASAP TO ED.

## 2022-06-15 ENCOUNTER — FORM ENCOUNTER (OUTPATIENT)
Age: 82
End: 2022-06-15

## 2022-07-07 ENCOUNTER — FORM ENCOUNTER (OUTPATIENT)
Age: 82
End: 2022-07-07

## 2022-10-28 ENCOUNTER — APPOINTMENT (OUTPATIENT)
Dept: CARDIOLOGY | Facility: CLINIC | Age: 82
End: 2022-10-28

## 2022-10-28 VITALS
HEIGHT: 69 IN | DIASTOLIC BLOOD PRESSURE: 70 MMHG | WEIGHT: 240 LBS | BODY MASS INDEX: 35.55 KG/M2 | SYSTOLIC BLOOD PRESSURE: 120 MMHG | OXYGEN SATURATION: 97 % | HEART RATE: 67 BPM

## 2022-10-28 PROCEDURE — 93000 ELECTROCARDIOGRAM COMPLETE: CPT

## 2022-10-28 PROCEDURE — 99214 OFFICE O/P EST MOD 30 MIN: CPT

## 2022-10-28 RX ORDER — QUETIAPINE 400 MG/1
TABLET, FILM COATED ORAL DAILY
Refills: 0 | Status: DISCONTINUED | COMMUNITY
End: 2022-10-28

## 2022-10-28 RX ORDER — ESCITALOPRAM OXALATE 20 MG/1
20 TABLET, FILM COATED ORAL DAILY
Refills: 0 | Status: DISCONTINUED | COMMUNITY
End: 2022-10-28

## 2022-10-28 RX ORDER — APIXABAN 5 MG/1
5 TABLET, FILM COATED ORAL
Refills: 0 | Status: DISCONTINUED | COMMUNITY
End: 2022-10-28

## 2022-10-31 NOTE — HISTORY OF PRESENT ILLNESS
[FreeTextEntry1] : JUDI HANEY is an 82-year-old male, with a PMHx significant for PAF, CAD s/p PCI of RCA, HTN, HLD, TIANA, lung CA s/p left pneumonectomy at [facility] two years ago, now with newly diagnosed right lower lung nodule, who presents today for a follow up visit. Has SOB with exertion, which is relieved with rest. SOB is progressive in nature. Otherwise: (-) fever, (-) chills, (-) chest pain, (-) cough, (-) hemoptysis, (-) recent URI, (-) abdominal pain, (-) nausea, (-) vomiting, (-) melena, (-) hematochezia, (-) leg swelling/pain, (-) recent travel, (-) prolonged immobility.\par \par EKG performed today revealed SR, (-) ST-T wave changes. No evidence of A-Fib. \par Of note, patient indicates he is unaware of his medications; unclear if he is taking Xarelto.

## 2022-10-31 NOTE — DISCUSSION/SUMMARY
[EKG obtained to assist in diagnosis and management of assessed problem(s)] : EKG obtained to assist in diagnosis and management of assessed problem(s) [FreeTextEntry1] : EKG performed today was SR with no evidence of A-Fib. \par \par Patient is currently off antidepressants. \par \par A-Fib: The impression is atrial fibrillation. Currently, the condition is stable. QTc is within normal range. Currently on Multaq 400 mg BID. There are no changes in medication management. Emphasized importance of medication compliance. Continue Multaq 400 mg BID and Xarelto 20 mg QD. \par \par SOB on Exertion: Due to exertional nature of symptoms, recommend a nuclear stress test to evaluate for CAD progression. Patient has a known hx of CAD s/p PCI of the RCA. \par \par CAD: The impression is coronary artery disease. There are no changes in medication management. Continue current medical therapy.\par \par HTN: The impression is hypertension. Currently, the condition is stable. There are no changes in medication management. Continue current medical therapy. Other planned treatments include an exercise regimen, weight loss, low sodium diet, and alcohol moderation.\par \par HLD: The impression is hyperlipidemia. Currently, the condition is stable. There are no changes in medication management. Continue current medical therapy. Other planned treatment includes diet modification, exercise, and weight loss.\par \par Instructed to follow up after testing is complete. \par \par Plan was discussed with the patient.

## 2022-11-30 ENCOUNTER — OUTPATIENT (OUTPATIENT)
Dept: OUTPATIENT SERVICES | Facility: HOSPITAL | Age: 82
LOS: 1 days | Discharge: HOME | End: 2022-11-30

## 2022-11-30 DIAGNOSIS — Z95.9 PRESENCE OF CARDIAC AND VASCULAR IMPLANT AND GRAFT, UNSPECIFIED: Chronic | ICD-10-CM

## 2022-11-30 DIAGNOSIS — I25.10 ATHEROSCLEROTIC HEART DISEASE OF NATIVE CORONARY ARTERY WITHOUT ANGINA PECTORIS: ICD-10-CM

## 2022-11-30 PROCEDURE — 78452 HT MUSCLE IMAGE SPECT MULT: CPT | Mod: 26,MH

## 2023-01-24 ENCOUNTER — RX RENEWAL (OUTPATIENT)
Age: 83
End: 2023-01-24

## 2023-05-18 ENCOUNTER — APPOINTMENT (OUTPATIENT)
Dept: PULMONOLOGY | Facility: CLINIC | Age: 83
End: 2023-05-18

## 2023-10-10 NOTE — ED ADULT NURSE NOTE - OBJECTIVE STATEMENT
9 (severe pain)
Patient BIBEMS for near syncopal episode while out to eat at a restaurant today. Patient c/o dizziness, denies nausea, vomiting, fever, chills, SOB, CP.

## 2023-10-13 ENCOUNTER — APPOINTMENT (OUTPATIENT)
Dept: CARDIOLOGY | Facility: CLINIC | Age: 83
End: 2023-10-13
Payer: MEDICARE

## 2023-10-13 VITALS
BODY MASS INDEX: 36.26 KG/M2 | HEIGHT: 67 IN | WEIGHT: 231 LBS | HEART RATE: 81 BPM | DIASTOLIC BLOOD PRESSURE: 90 MMHG | OXYGEN SATURATION: 98 % | SYSTOLIC BLOOD PRESSURE: 138 MMHG

## 2023-10-13 DIAGNOSIS — I48.91 UNSPECIFIED ATRIAL FIBRILLATION: ICD-10-CM

## 2023-10-13 DIAGNOSIS — Z01.818 ENCOUNTER FOR OTHER PREPROCEDURAL EXAMINATION: ICD-10-CM

## 2023-10-13 DIAGNOSIS — I10 ESSENTIAL (PRIMARY) HYPERTENSION: ICD-10-CM

## 2023-10-13 PROCEDURE — 93306 TTE W/DOPPLER COMPLETE: CPT

## 2023-10-13 PROCEDURE — 99214 OFFICE O/P EST MOD 30 MIN: CPT

## 2023-10-13 PROCEDURE — 93000 ELECTROCARDIOGRAM COMPLETE: CPT

## 2023-10-13 RX ORDER — RIVAROXABAN 20 MG/1
20 TABLET, FILM COATED ORAL DAILY
Qty: 30 | Refills: 5 | Status: DISCONTINUED | COMMUNITY
Start: 2022-11-30 | End: 2023-10-13

## 2023-10-13 RX ORDER — RIVAROXABAN 20 MG/1
20 TABLET, FILM COATED ORAL
Qty: 90 | Refills: 3 | Status: ACTIVE | COMMUNITY
Start: 2023-10-13 | End: 1900-01-01

## 2023-10-13 RX ORDER — DRONEDARONE 400 MG/1
400 TABLET, FILM COATED ORAL TWICE DAILY
Qty: 60 | Refills: 0 | Status: DISCONTINUED | COMMUNITY
Start: 2022-09-28 | End: 2023-10-13

## 2023-10-26 LAB
ALBUMIN SERPL ELPH-MCNC: 4.3 G/DL
ALP BLD-CCNC: 174 U/L
ALT SERPL-CCNC: 5 U/L
ANION GAP SERPL CALC-SCNC: 14 MMOL/L
AST SERPL-CCNC: 13 U/L
BILIRUB SERPL-MCNC: 0.5 MG/DL
BUN SERPL-MCNC: 15 MG/DL
CALCIUM SERPL-MCNC: 9 MG/DL
CHLORIDE SERPL-SCNC: 106 MMOL/L
CHOLEST SERPL-MCNC: 207 MG/DL
CO2 SERPL-SCNC: 24 MMOL/L
CREAT SERPL-MCNC: 0.9 MG/DL
EGFR: 86 ML/MIN/1.73M2
ESTIMATED AVERAGE GLUCOSE: 100 MG/DL
GLUCOSE SERPL-MCNC: 105 MG/DL
HBA1C MFR BLD HPLC: 5.1 %
HCT VFR BLD CALC: 38.5 %
HDLC SERPL-MCNC: 47 MG/DL
HGB BLD-MCNC: 11.8 G/DL
LDLC SERPL CALC-MCNC: 145 MG/DL
MCHC RBC-ENTMCNC: 28.2 PG
MCHC RBC-ENTMCNC: 30.6 G/DL
MCV RBC AUTO: 91.9 FL
NONHDLC SERPL-MCNC: 160 MG/DL
PLATELET # BLD AUTO: 177 K/UL
PMV BLD: 10.3 FL
POTASSIUM SERPL-SCNC: 3.9 MMOL/L
PROT SERPL-MCNC: 7.5 G/DL
RBC # BLD: 4.19 M/UL
RBC # FLD: 13.2 %
SODIUM SERPL-SCNC: 144 MMOL/L
TRIGL SERPL-MCNC: 77 MG/DL
TSH SERPL-ACNC: 0.76 UIU/ML
WBC # FLD AUTO: 6.16 K/UL

## 2023-10-27 ENCOUNTER — NON-APPOINTMENT (OUTPATIENT)
Age: 83
End: 2023-10-27

## 2023-10-27 DIAGNOSIS — I25.10 ATHEROSCLEROTIC HEART DISEASE OF NATIVE CORONARY ARTERY W/OUT ANGINA PECTORIS: ICD-10-CM

## 2023-10-27 DIAGNOSIS — E78.5 HYPERLIPIDEMIA, UNSPECIFIED: ICD-10-CM

## 2023-10-27 DIAGNOSIS — Z98.61 ATHEROSCLEROTIC HEART DISEASE OF NATIVE CORONARY ARTERY W/OUT ANGINA PECTORIS: ICD-10-CM

## 2023-10-27 RX ORDER — ROSUVASTATIN CALCIUM 5 MG/1
5 TABLET, FILM COATED ORAL DAILY
Qty: 90 | Refills: 3 | Status: ACTIVE | COMMUNITY
Start: 2023-10-27 | End: 1900-01-01

## 2023-11-03 ENCOUNTER — APPOINTMENT (OUTPATIENT)
Dept: CARDIOLOGY | Facility: CLINIC | Age: 83
End: 2023-11-03
Payer: MEDICARE

## 2023-11-03 PROCEDURE — ZZZZZ: CPT

## 2023-12-01 ENCOUNTER — APPOINTMENT (OUTPATIENT)
Dept: CARDIOLOGY | Facility: CLINIC | Age: 83
End: 2023-12-01
Payer: MEDICARE

## 2023-12-01 PROCEDURE — ZZZZZ: CPT

## 2023-12-08 ENCOUNTER — APPOINTMENT (OUTPATIENT)
Dept: CARDIOLOGY | Facility: CLINIC | Age: 83
End: 2023-12-08
Payer: MEDICARE

## 2023-12-08 PROCEDURE — 99457 RPM TX MGMT 1ST 20 MIN: CPT

## 2024-10-25 ENCOUNTER — RX RENEWAL (OUTPATIENT)
Age: 84
End: 2024-10-25

## 2024-10-29 ENCOUNTER — APPOINTMENT (OUTPATIENT)
Dept: CARDIOLOGY | Facility: CLINIC | Age: 84
End: 2024-10-29
Payer: MEDICARE

## 2024-10-29 VITALS
DIASTOLIC BLOOD PRESSURE: 90 MMHG | HEIGHT: 68 IN | HEART RATE: 67 BPM | BODY MASS INDEX: 35.31 KG/M2 | OXYGEN SATURATION: 99 % | WEIGHT: 233 LBS | SYSTOLIC BLOOD PRESSURE: 142 MMHG

## 2024-10-29 DIAGNOSIS — Z98.61 ATHEROSCLEROTIC HEART DISEASE OF NATIVE CORONARY ARTERY W/OUT ANGINA PECTORIS: ICD-10-CM

## 2024-10-29 DIAGNOSIS — I10 ESSENTIAL (PRIMARY) HYPERTENSION: ICD-10-CM

## 2024-10-29 DIAGNOSIS — I48.91 UNSPECIFIED ATRIAL FIBRILLATION: ICD-10-CM

## 2024-10-29 DIAGNOSIS — E66.9 OBESITY, UNSPECIFIED: ICD-10-CM

## 2024-10-29 DIAGNOSIS — I25.10 ATHEROSCLEROTIC HEART DISEASE OF NATIVE CORONARY ARTERY W/OUT ANGINA PECTORIS: ICD-10-CM

## 2024-10-29 DIAGNOSIS — E78.5 HYPERLIPIDEMIA, UNSPECIFIED: ICD-10-CM

## 2024-10-29 PROCEDURE — 99214 OFFICE O/P EST MOD 30 MIN: CPT

## 2024-10-29 PROCEDURE — 93306 TTE W/DOPPLER COMPLETE: CPT

## 2024-10-29 PROCEDURE — G2211 COMPLEX E/M VISIT ADD ON: CPT

## 2024-11-19 ENCOUNTER — APPOINTMENT (OUTPATIENT)
Dept: PULMONOLOGY | Facility: CLINIC | Age: 84
End: 2024-11-19
Payer: MEDICARE

## 2024-11-19 DIAGNOSIS — E66.9 OBESITY, UNSPECIFIED: ICD-10-CM

## 2024-11-19 DIAGNOSIS — G47.33 OBSTRUCTIVE SLEEP APNEA (ADULT) (PEDIATRIC): ICD-10-CM

## 2024-11-19 PROCEDURE — G2211 COMPLEX E/M VISIT ADD ON: CPT

## 2024-11-19 PROCEDURE — 99214 OFFICE O/P EST MOD 30 MIN: CPT

## 2024-11-25 ENCOUNTER — RX RENEWAL (OUTPATIENT)
Age: 84
End: 2024-11-25

## 2025-01-08 ENCOUNTER — APPOINTMENT (OUTPATIENT)
Dept: PULMONOLOGY | Facility: CLINIC | Age: 85
End: 2025-01-08

## 2025-03-26 NOTE — ED PROCEDURE NOTE - CPROC ED PHYSICIAN PRESENCE1
Reason for call:   [x] Refill   [] Prior Auth  [] Other:     Office:   [x] PCP/Provider - [facility] [place]  Authorized By: Tiffany Yang DO    [] Specialty/Provider -     Medication:lisinopril (ZESTRIL) 30 mg tablet     Pharmacy: [facility]/pharmacy #[id number] - [place], [place] - [address] [facility] [phone]     Local Pharmacy   Does the patient have enough for 3 days?   [] Yes   [x] No - Send as HP to POD    Mail Away Pharmacy   Does the patient have enough for 10 days?   [] Yes   [] No - Send as HP to POD    
I was present during the key portion of the procedure.
I was present during the key portion of the procedure.

## 2025-04-28 ENCOUNTER — APPOINTMENT (OUTPATIENT)
Dept: CARDIOLOGY | Facility: CLINIC | Age: 85
End: 2025-04-28
